# Patient Record
Sex: FEMALE | NOT HISPANIC OR LATINO | Employment: OTHER | RURAL
[De-identification: names, ages, dates, MRNs, and addresses within clinical notes are randomized per-mention and may not be internally consistent; named-entity substitution may affect disease eponyms.]

---

## 2022-10-20 ENCOUNTER — CLINICAL SUPPORT (OUTPATIENT)
Dept: FAMILY MEDICINE CLINIC | Facility: CLINIC | Age: 87
End: 2022-10-20

## 2022-10-20 DIAGNOSIS — Z23 FLU VACCINE NEED: Primary | ICD-10-CM

## 2022-10-20 PROCEDURE — 90662 IIV NO PRSV INCREASED AG IM: CPT | Performed by: NURSE PRACTITIONER

## 2022-10-20 PROCEDURE — G0008 ADMIN INFLUENZA VIRUS VAC: HCPCS | Performed by: NURSE PRACTITIONER

## 2022-12-30 ENCOUNTER — CLINICAL SUPPORT (OUTPATIENT)
Dept: FAMILY MEDICINE CLINIC | Facility: CLINIC | Age: 87
End: 2022-12-30

## 2022-12-30 DIAGNOSIS — Z00.00 ENCOUNTER FOR ANNUAL PHYSICAL EXAM: Primary | ICD-10-CM

## 2022-12-30 PROCEDURE — 36415 COLL VENOUS BLD VENIPUNCTURE: CPT | Performed by: NURSE PRACTITIONER

## 2022-12-30 NOTE — PROGRESS NOTES
Venipuncture Blood Specimen Collection  Venipuncture performed in left arm by Agatha Merrill MA with good hemostasis. Patient tolerated the procedure well without complications.   12/30/22   Agatha Merrill MA

## 2022-12-31 LAB
ALBUMIN SERPL-MCNC: 4.3 G/DL (ref 3.5–4.6)
ALBUMIN/GLOB SERPL: 1.9 {RATIO} (ref 1.2–2.2)
ALP SERPL-CCNC: 103 IU/L (ref 44–121)
ALT SERPL-CCNC: 17 IU/L (ref 0–32)
AST SERPL-CCNC: 17 IU/L (ref 0–40)
BASOPHILS # BLD AUTO: 0 X10E3/UL (ref 0–0.2)
BASOPHILS NFR BLD AUTO: 0 %
BILIRUB SERPL-MCNC: 0.4 MG/DL (ref 0–1.2)
BUN SERPL-MCNC: 28 MG/DL (ref 10–36)
BUN/CREAT SERPL: 26 (ref 12–28)
CALCIUM SERPL-MCNC: 9.3 MG/DL (ref 8.7–10.3)
CHLORIDE SERPL-SCNC: 102 MMOL/L (ref 96–106)
CHOLEST SERPL-MCNC: 128 MG/DL (ref 100–199)
CO2 SERPL-SCNC: 27 MMOL/L (ref 20–29)
CREAT SERPL-MCNC: 1.06 MG/DL (ref 0.57–1)
EGFRCR SERPLBLD CKD-EPI 2021: 50 ML/MIN/1.73
EOSINOPHIL # BLD AUTO: 0.1 X10E3/UL (ref 0–0.4)
EOSINOPHIL NFR BLD AUTO: 2 %
ERYTHROCYTE [DISTWIDTH] IN BLOOD BY AUTOMATED COUNT: 13.1 % (ref 11.7–15.4)
GLOBULIN SER CALC-MCNC: 2.3 G/DL (ref 1.5–4.5)
GLUCOSE SERPL-MCNC: 104 MG/DL (ref 70–99)
HBA1C MFR BLD: 6.6 % (ref 4.8–5.6)
HCT VFR BLD AUTO: 42.6 % (ref 34–46.6)
HCV AB S/CO SERPL IA: 0.2 S/CO RATIO (ref 0–0.9)
HDLC SERPL-MCNC: 50 MG/DL
HGB BLD-MCNC: 13.8 G/DL (ref 11.1–15.9)
IMM GRANULOCYTES # BLD AUTO: 0 X10E3/UL (ref 0–0.1)
IMM GRANULOCYTES NFR BLD AUTO: 0 %
LDLC SERPL CALC-MCNC: 61 MG/DL (ref 0–99)
LYMPHOCYTES # BLD AUTO: 1.2 X10E3/UL (ref 0.7–3.1)
LYMPHOCYTES NFR BLD AUTO: 21 %
MCH RBC QN AUTO: 28.9 PG (ref 26.6–33)
MCHC RBC AUTO-ENTMCNC: 32.4 G/DL (ref 31.5–35.7)
MCV RBC AUTO: 89 FL (ref 79–97)
MONOCYTES # BLD AUTO: 0.5 X10E3/UL (ref 0.1–0.9)
MONOCYTES NFR BLD AUTO: 8 %
NEUTROPHILS # BLD AUTO: 3.9 X10E3/UL (ref 1.4–7)
NEUTROPHILS NFR BLD AUTO: 69 %
PLATELET # BLD AUTO: 157 X10E3/UL (ref 150–450)
POTASSIUM SERPL-SCNC: 4.5 MMOL/L (ref 3.5–5.2)
PROT SERPL-MCNC: 6.6 G/DL (ref 6–8.5)
RBC # BLD AUTO: 4.78 X10E6/UL (ref 3.77–5.28)
SODIUM SERPL-SCNC: 142 MMOL/L (ref 134–144)
TRIGL SERPL-MCNC: 91 MG/DL (ref 0–149)
TSH SERPL DL<=0.005 MIU/L-ACNC: 1.43 UIU/ML (ref 0.45–4.5)
VLDLC SERPL CALC-MCNC: 17 MG/DL (ref 5–40)
WBC # BLD AUTO: 5.7 X10E3/UL (ref 3.4–10.8)

## 2023-01-06 ENCOUNTER — TELEPHONE (OUTPATIENT)
Dept: FAMILY MEDICINE CLINIC | Facility: CLINIC | Age: 88
End: 2023-01-06
Payer: MEDICARE

## 2023-01-06 NOTE — TELEPHONE ENCOUNTER
Caller: Lou Garcia    Relationship: Self    Best call back number: [phone]    What test was performed: LAB RESULTS    When was the test performed: 12.30.22  Where was the test performed: IN OFFICE    Additional notes: CALL WITH RESULTS

## 2023-01-06 NOTE — TELEPHONE ENCOUNTER
Called and spoke with patient and advised her that she needs an appointment.    I made her an appointment

## 2023-01-10 ENCOUNTER — OFFICE VISIT (OUTPATIENT)
Dept: FAMILY MEDICINE CLINIC | Facility: CLINIC | Age: 88
End: 2023-01-10
Payer: MEDICARE

## 2023-01-10 VITALS
SYSTOLIC BLOOD PRESSURE: 130 MMHG | TEMPERATURE: 97.5 F | OXYGEN SATURATION: 96 % | WEIGHT: 147.8 LBS | HEART RATE: 84 BPM | DIASTOLIC BLOOD PRESSURE: 78 MMHG | RESPIRATION RATE: 18 BRPM

## 2023-01-10 DIAGNOSIS — R00.2 INTERMITTENT PALPITATIONS: ICD-10-CM

## 2023-01-10 DIAGNOSIS — I10 PRIMARY HYPERTENSION: Primary | ICD-10-CM

## 2023-01-10 DIAGNOSIS — I48.0 PAROXYSMAL ATRIAL FIBRILLATION: ICD-10-CM

## 2023-01-10 DIAGNOSIS — G47.33 OSA (OBSTRUCTIVE SLEEP APNEA): ICD-10-CM

## 2023-01-10 DIAGNOSIS — E11.9 CONTROLLED TYPE 2 DIABETES MELLITUS WITHOUT COMPLICATION, WITHOUT LONG-TERM CURRENT USE OF INSULIN: ICD-10-CM

## 2023-01-10 PROBLEM — R27.0 ATAXIA: Status: ACTIVE | Noted: 2023-01-10

## 2023-01-10 PROCEDURE — 99214 OFFICE O/P EST MOD 30 MIN: CPT | Performed by: NURSE PRACTITIONER

## 2023-01-10 RX ORDER — APIXABAN 2.5 MG/1
2.5 TABLET, FILM COATED ORAL 2 TIMES DAILY
COMMUNITY
Start: 2023-01-02 | End: 2023-03-08 | Stop reason: SDUPTHER

## 2023-01-10 RX ORDER — PANTOPRAZOLE SODIUM 40 MG/1
40 TABLET, DELAYED RELEASE ORAL EVERY MORNING
COMMUNITY
Start: 2022-10-26

## 2023-01-10 RX ORDER — LOSARTAN POTASSIUM 50 MG/1
50 TABLET ORAL DAILY
COMMUNITY
Start: 2023-01-02 | End: 2023-03-09 | Stop reason: SDUPTHER

## 2023-01-10 RX ORDER — METOPROLOL SUCCINATE 50 MG/1
50 TABLET, EXTENDED RELEASE ORAL DAILY
COMMUNITY
Start: 2022-11-15 | End: 2023-03-09 | Stop reason: SDUPTHER

## 2023-01-10 NOTE — ASSESSMENT & PLAN NOTE
Diabetes mellitus, A1c 6.6%     Patient states very satisfactory fasting sugars at home.  Plan:       1.  Glipizide ER 2.5 mg once daily in the morning only if fasting blood sugar exceeds 100

## 2023-01-10 NOTE — ASSESSMENT & PLAN NOTE
Continues clinically in sinus rhythm with a well-controlled rate on today's exam and on all medical visits elsewhere recently.  Plan:       1.  Diltiazem  mg as above for rate control.       2.  Eliquis 2.5 mg twice a day for anticoagulation.       3.  Aspirin 81 mg 3 times a week for anticoagulation

## 2023-01-10 NOTE — ASSESSMENT & PLAN NOTE
Intermittent palpitations precipitating significant anxiety episodes.  Cardiac monitor revealed frequent small runs of SVT, a single 3 beat run of ventricular tachycardia.  Patient was placed on metoprolol succinate 50 mg daily in addition to her other medications.  Asymptomatic as of visit 6/17/2022 Plan:       1.  Metoprolol succinate 50 mg, 1 daily to be continued

## 2023-01-10 NOTE — PROGRESS NOTES
Office Note     Name: Lou Garcia    : 1932     MRN: [id number]     Chief Complaint  Discuss lab results    Subjective     History of Present Illness:  Lou Garcia is a 89 y.o. female who presents today for follow-up on recent lab work. She is followed predominantly for HTN, afib, AKASH and DM II. Her BP is well-controlled, she checks this daily as well as her glucose. She checks her glucose every evening and some morning. Fasting glucose this morning was 110, last night's reading was 143, this is average for her. Her A1C is at goal, 6.6%. She is sleeping well with continued BIPAP compliance. She has no complaints or concerns today.     Review of Systems   Constitutional: Negative for activity change, appetite change and fatigue.   HENT: Negative for congestion, hearing loss and nosebleeds.    Eyes: Negative for blurred vision, double vision and visual disturbance.   Respiratory: Negative for cough, chest tightness, shortness of breath and wheezing.    Cardiovascular: Negative for chest pain, palpitations and leg swelling.   Gastrointestinal: Negative for abdominal pain, constipation, diarrhea, vomiting and indigestion.   Endocrine: Negative for polydipsia and polyuria.   Genitourinary: Positive for urgency. Negative for difficulty urinating and urinary incontinence.   Musculoskeletal: Positive for gait problem. Negative for arthralgias and myalgias.   Skin: Negative for rash and bruise.   Neurological: Negative for dizziness, weakness, light-headedness, numbness, headache, memory problem and confusion.   Psychiatric/Behavioral: Negative for sleep disturbance and depressed mood. The patient is not nervous/anxious.        Objective     History reviewed. No pertinent past medical history.  History reviewed. No pertinent surgical history.  History reviewed. No pertinent family history.    Vital Signs  /78 (BP Location: Left arm, Patient Position: Sitting, Cuff Size: Adult)   Pulse 84   Temp 97.5 °F  (36.4 °C) (Temporal)   Resp 18   Wt 67 kg (147 lb 12.8 oz)   SpO2 96%   There is no height or weight on file to calculate BMI.    Physical Exam  Vitals reviewed.   Constitutional:       Appearance: Normal appearance.   HENT:      Head: Normocephalic and atraumatic.      Right Ear: Tympanic membrane, ear canal and external ear normal.      Left Ear: Tympanic membrane, ear canal and external ear normal.      Nose: Nose normal.      Mouth/Throat:      Mouth: Mucous membranes are moist.      Pharynx: Oropharynx is clear.   Eyes:      Conjunctiva/sclera: Conjunctivae normal.      Pupils: Pupils are equal, round, and reactive to light.   Cardiovascular:      Rate and Rhythm: Normal rate and regular rhythm.      Pulses: Normal pulses.      Heart sounds: Normal heart sounds.   Pulmonary:      Effort: Pulmonary effort is normal.      Breath sounds: Normal breath sounds.   Abdominal:      General: Bowel sounds are normal.      Palpations: Abdomen is soft.   Musculoskeletal:         General: Normal range of motion.      Cervical back: Normal range of motion and neck supple.   Skin:     General: Skin is warm.      Coloration: Skin is pale.   Neurological:      Mental Status: She is alert and oriented to person, place, and time.   Psychiatric:         Mood and Affect: Mood normal.         Behavior: Behavior normal.         Thought Content: Thought content normal.         Judgment: Judgment normal.          The following data was reviewed by: SHELBY Dove on 01/10/2023:  Common labs    Common Labs 12/30/22 12/30/22 12/30/22 12/30/22    1009 1009 1009 1009   Glucose  104 (A)     BUN  28     Creatinine  1.06 (A)     Sodium  142     Potassium  4.5     Chloride  102     Calcium  9.3     Total Protein  6.6     Albumin  4.3     Total Bilirubin  0.4     Alkaline Phosphatase  103     AST (SGOT)  17     ALT (SGPT)  17     WBC 5.7      Hemoglobin 13.8      Hematocrit 42.6      Platelets 157      Total Cholesterol   128     Triglycerides   91    HDL Cholesterol   50    LDL Cholesterol    61    Hemoglobin A1C    6.6 (A)   (A) Abnormal value       Comments are available for some flowsheets but are not being displayed.                POCT Results (if applicable):  Results for orders placed or performed in visit on 12/30/22   Comprehensive Metabolic Panel    Specimen: Arm, Left; Blood    Blood  Manual Differen   Result Value Ref Range    Glucose 104 (H) 70 - 99 mg/dL    BUN 28 10 - 36 mg/dL    Creatinine 1.06 (H) 0.57 - 1.00 mg/dL    EGFR Result 50 (L) >59 mL/min/1.73    BUN/Creatinine Ratio 26 12 - 28    Sodium 142 134 - 144 mmol/L    Potassium 4.5 3.5 - 5.2 mmol/L    Chloride 102 96 - 106 mmol/L    Total CO2 27 20 - 29 mmol/L    Calcium 9.3 8.7 - 10.3 mg/dL    Total Protein 6.6 6.0 - 8.5 g/dL    Albumin 4.3 3.5 - 4.6 g/dL    Globulin 2.3 1.5 - 4.5 g/dL    A/G Ratio 1.9 1.2 - 2.2    Total Bilirubin 0.4 0.0 - 1.2 mg/dL    Alkaline Phosphatase 103 44 - 121 IU/L    AST (SGOT) 17 0 - 40 IU/L    ALT (SGPT) 17 0 - 32 IU/L   Lipid Panel    Specimen: Arm, Left; Blood    Blood  Manual Differen   Result Value Ref Range    Total Cholesterol 128 100 - 199 mg/dL    Triglycerides 91 0 - 149 mg/dL    HDL Cholesterol 50 >39 mg/dL    VLDL Cholesterol Isaias 17 5 - 40 mg/dL    LDL Chol Calc (NIH) 61 0 - 99 mg/dL   Hemoglobin A1c    Specimen: Arm, Left; Blood    Blood  Manual Differen   Result Value Ref Range    Hemoglobin A1C 6.6 (H) 4.8 - 5.6 %   TSH Rfx On Abnormal To Free T4    Specimen: Arm, Left; Blood    Blood  Manual Differen   Result Value Ref Range    TSH 1.430 0.450 - 4.500 uIU/mL   Hepatitis C Antibody    Specimen: Arm, Left; Blood    Blood  Manual Differen   Result Value Ref Range    Hep C Virus Ab 0.2 0.0 - 0.9 s/co ratio   CBC w AUTO Differential    Specimen: Arm, Left; Blood    Blood  Manual Differen   Result Value Ref Range    WBC 5.7 3.4 - 10.8 x10E3/uL    RBC 4.78 3.77 - 5.28 x10E6/uL    Hemoglobin 13.8 11.1 - 15.9 g/dL    Hematocrit 42.6  34.0 - 46.6 %    MCV 89 79 - 97 fL    MCH 28.9 26.6 - 33.0 pg    MCHC 32.4 31.5 - 35.7 g/dL    RDW 13.1 11.7 - 15.4 %    Platelets 157 150 - 450 x10E3/uL    Neutrophil Rel % 69 Not Estab. %    Lymphocyte Rel % 21 Not Estab. %    Monocyte Rel % 8 Not Estab. %    Eosinophil Rel % 2 Not Estab. %    Basophil Rel % 0 Not Estab. %    Neutrophils Absolute 3.9 1.4 - 7.0 x10E3/uL    Lymphocytes Absolute 1.2 0.7 - 3.1 x10E3/uL    Monocytes Absolute 0.5 0.1 - 0.9 x10E3/uL    Eosinophils Absolute 0.1 0.0 - 0.4 x10E3/uL    Basophils Absolute 0.0 0.0 - 0.2 x10E3/uL    Immature Granulocyte Rel % 0 Not Estab. %    Immature Grans Absolute 0.0 0.0 - 0.1 x10E3/uL            Assessment and Plan     Diagnoses and all orders for this visit:    1. Primary hypertension (Primary)  Assessment & Plan:  BP well controlled on current regimen.   Low sodium diet        2. Paroxysmal atrial fibrillation (HCC)  Assessment & Plan:  Continues clinically in sinus rhythm with a well-controlled rate on today's exam and on all medical visits elsewhere recently.  Plan:       1.  Diltiazem  mg as above for rate control.       2.  Eliquis 2.5 mg twice a day for anticoagulation.       3.  Aspirin 81 mg 3 times a week for anticoagulation      3. Controlled type 2 diabetes mellitus without complication, without long-term current use of insulin ([facility])  Assessment & Plan:  Diabetes mellitus, A1c 6.6%     Patient states very satisfactory fasting sugars at home.  Plan:       1.  Glipizide ER 2.5 mg once daily in the morning only if fasting blood sugar exceeds 100      4. AKASH (obstructive sleep apnea)  Assessment & Plan:   followed by Dr. Meehan   .  Plan:       1.  BiPAP to be continued.      5. Intermittent palpitations  Assessment & Plan:   Intermittent palpitations precipitating significant anxiety episodes.  Cardiac monitor revealed frequent small runs of SVT, a single 3 beat run of ventricular tachycardia.  Patient was placed on metoprolol succinate 50 mg  daily in addition to her other medications.  Asymptomatic as of visit 6/17/2022 Plan:       1.  Metoprolol succinate 50 mg, 1 daily to be continued      BMI cannot be calculated due to outdated height or weight values.  Please input a current height/weight in Vitals and re-renter BMIFOLLOWUP in Note to pull in correct documentation based on BMI range.    I spent 35 minutes caring for Lou on this date of service. This time includes time spent by me in the following activities:preparing for the visit, reviewing tests, documenting information in the medical record and independently interpreting results and communicating that information with the patient/family/caregiver    Vaccine Counseling:  “Discussed risks/benefits to vaccination, reviewed components of the vaccine, discussed VIS, discussed informed consent, informed consent obtained. Patient/Parent was allowed to accept or refuse vaccine. Questions answered to satisfactory state of patient/Parent. We reviewed typical age appropriate and seasonally appropriate vaccinations. Reviewed immunization history and updated state vaccination form as needed. Patient was counseled on DTap/DT  PPSV23  Zoster      Advanced Care Planning:   Patient has an advance directive (not in EMR), copy requested.        Follow Up  Return in about 6 months (around 7/10/2023) for Next scheduled follow up.    Sandra Mckenzie, SHELBY

## 2023-02-22 ENCOUNTER — TELEPHONE (OUTPATIENT)
Dept: CARDIOLOGY | Facility: CLINIC | Age: 88
End: 2023-02-22
Payer: MEDICARE

## 2023-02-22 ENCOUNTER — TELEPHONE (OUTPATIENT)
Dept: FAMILY MEDICINE CLINIC | Facility: CLINIC | Age: 88
End: 2023-02-22
Payer: MEDICARE

## 2023-02-22 NOTE — TELEPHONE ENCOUNTER
PATIENT CALLED AND LEFT A VOICEMAIL STATING SHE WAS SEEN FOR AFIB IN January AND WAS TOLD IF SHE NEEDED TO BE SEEN SOONER THAN TO HER FOLLOW UP, TO CALL. PATIENT STATED IN VOICEMAIL THAT SHE HAD AN EPISODE RECENTLY AND ENDED UP IN THE ER. SHE STATES SHE WOULD LIKE TO BE SEEN BUT SHE ISN'T SCHEDULED UNTIL 03/30/23. PATIENT CAN BE REACHED [phone].

## 2023-02-22 NOTE — TELEPHONE ENCOUNTER
Called pt with her concerns of being able to be seen sooner.  She has been rescheduled for 3/8/2023 @ 11:00 AM.  She is advised to promptly seek medical attention if she is having symptoms.  She relates she will be seeing Dr. Mcnulty on Thursday 2/23/2023.  She verbalizes understanding.

## 2023-02-22 NOTE — TELEPHONE ENCOUNTER
Caller: Lou Garcia    Relationship: Self    Best call back number: [phone]    What medication are you requesting:   MUSCLE RELAXER     What are your current symptoms:   NERVE PAIN AND MUSCLE PAIN     How long have you been experiencing symptoms:   A WHILE NOW     Have you had these symptoms before:    [x] Yes  [] No    Have you been treated for these symptoms before:   [x] Yes  [] No    If a prescription is needed, what is your preferred pharmacy and phone number:      [id number] [place] [address] [address] AT [facility] [address] - [phone]  - [phone] FX  [phone]    Additional notes:  PATIENT WOULD LIKE FOR MEDICATION TO BE CALLED INTO THE PHARMACY TO HELP WITH NERVE AND MUSCLE PAIN

## 2023-03-07 ENCOUNTER — TELEPHONE (OUTPATIENT)
Dept: CARDIOLOGY | Facility: CLINIC | Age: 88
End: 2023-03-07
Payer: MEDICARE

## 2023-03-07 NOTE — TELEPHONE ENCOUNTER
Called pt back as she needs refill on her Metoprolol.  She relates she is headed to [facility] to obtain enough Metoprolol for today and tomorrow as she knows she has an appointment on 3/8/2023 @ 11:00 AM.  Wishes to address her meds at that time.

## 2023-03-07 NOTE — TELEPHONE ENCOUNTER
Incoming Refill Request      Medication requested (name and dose): METOPROLOL - UNSURE OF DOSING    Pharmacy where request should be sent: [facility] [place]    Additional details provided by patient: COMPLETELY OUT OF REFILLS - ONLY WANTS 30 DAYS AT THIS TIME    Best call back number: [phone]    Does the patient have less than a 3 day supply:  [x] Yes  [] No    Brenna Gregory Rep  03/07/23, 16:06 EST

## 2023-03-08 ENCOUNTER — OFFICE VISIT (OUTPATIENT)
Dept: CARDIOLOGY | Facility: CLINIC | Age: 88
End: 2023-03-08
Payer: MEDICARE

## 2023-03-08 VITALS
SYSTOLIC BLOOD PRESSURE: 128 MMHG | BODY MASS INDEX: 26.5 KG/M2 | OXYGEN SATURATION: 95 % | WEIGHT: 144 LBS | HEART RATE: 80 BPM | HEIGHT: 62 IN | DIASTOLIC BLOOD PRESSURE: 68 MMHG

## 2023-03-08 DIAGNOSIS — G47.33 OSA (OBSTRUCTIVE SLEEP APNEA): ICD-10-CM

## 2023-03-08 DIAGNOSIS — I48.0 PAROXYSMAL ATRIAL FIBRILLATION: ICD-10-CM

## 2023-03-08 DIAGNOSIS — Z09 HOSPITAL DISCHARGE FOLLOW-UP: ICD-10-CM

## 2023-03-08 DIAGNOSIS — E61.2 MAGNESIUM DEFICIENCY: ICD-10-CM

## 2023-03-08 PROCEDURE — 99214 OFFICE O/P EST MOD 30 MIN: CPT | Performed by: NURSE PRACTITIONER

## 2023-03-08 RX ORDER — APIXABAN 2.5 MG/1
2.5 TABLET, FILM COATED ORAL 2 TIMES DAILY
Qty: 60 TABLET | Refills: 6 | Status: SHIPPED | OUTPATIENT
Start: 2023-03-08 | End: 2023-03-09 | Stop reason: SDUPTHER

## 2023-03-08 RX ORDER — ALPRAZOLAM 0.25 MG/1
0.25 TABLET ORAL 2 TIMES DAILY PRN
COMMUNITY

## 2023-03-08 RX ORDER — ASPIRIN 81 MG/1
81 TABLET ORAL DAILY
COMMUNITY

## 2023-03-08 NOTE — TELEPHONE ENCOUNTER
Incoming Refill Request      Medication requested (name and dose): ELIQUIS 2.5MG    Pharmacy where request should be sent: [facility] IN [place]    Additional details provided by patient: PATIENT REQUESTED 60 DAY SUPPLY    Best call back number: [phone]    Does the patient have less than a 3 day supply:  [] Yes  [x] No    Brenna Gregory Rep  03/08/23, 09:11 EST

## 2023-03-09 DIAGNOSIS — E61.2 MAGNESIUM DEFICIENCY: ICD-10-CM

## 2023-03-09 PROBLEM — R00.2 INTERMITTENT PALPITATIONS: Status: RESOLVED | Noted: 2023-01-10 | Resolved: 2023-03-09

## 2023-03-09 PROCEDURE — 93000 ELECTROCARDIOGRAM COMPLETE: CPT | Performed by: NURSE PRACTITIONER

## 2023-03-09 RX ORDER — LOSARTAN POTASSIUM 50 MG/1
50 TABLET ORAL DAILY
Qty: 90 TABLET | Refills: 1 | Status: SHIPPED | OUTPATIENT
Start: 2023-03-09

## 2023-03-09 RX ORDER — METOPROLOL SUCCINATE 50 MG/1
50 TABLET, EXTENDED RELEASE ORAL DAILY
Qty: 90 TABLET | Refills: 1 | Status: SHIPPED | OUTPATIENT
Start: 2023-03-09

## 2023-03-10 NOTE — ASSESSMENT & PLAN NOTE
- Should be getting new BiPAP soon.  As hers is over 5 years old.    - Patient will need a 31 to 90-day follow-up

## 2023-03-10 NOTE — PROGRESS NOTES
[facility] Provider Note     Date:   2023   Name: Lou Garcia  :   1932  PCP: Sandra Mckenzie APRN    Chief Complaint   Patient presents with   • Atrial Fibrillation     Follow up from ER 2023       Subjective     History of Present Illness  Lou Garcia is a 89 y.o. female who presents today for follow-up from ER.  She has atrial fibrillation as well as a new diagnosis of AKASH.  She has not gotten her BiPAP yet so she will need to come back for 31 to 90-day after it is set up.    2023 [facility] ER discharge note reviewed.  Patient presented with a sensation of palpitations in her chest.  She was concerned that her A-fib was acting up.  Her EKG G was normal sinus rhythm with no ST changes.  Her magnesium was borderline low at 1.8 and patient was complaining of muscle cramps.  She was advised to replace magnesium with Rolaids 3 in the morning and 3 at night.  She reports that she has not had any further episodes since starting the magnesium.  Her daughter is with her today and she agrees that she seems to be doing much better since starting her Rolaids.  This also makes me concerned perhaps she also has some underlying acid reflux issues.  As her chest pain was in the center almost epigastric area.  Patient, daughter, and myself discussed the possibility of increasing beta-blocker but decided not to at this time since patient only had this 1 episode and it was not in the left chest or sense of palpitations was mid sternum epigastric area and it has resolved since starting Rolaids.  I would like to update labs just to make sure that she is not getting too much magnesium now.    History of AKASH with a baseline AHI of 106 on 2016.  We sent an order for new PAP machine since hers was over 5 years old in 2023.  Staff called [employer] today to get an update and they said they just got new BiPAP skin and will call patient  to get set up.  Patient will need a 31 to 90-day follow-up on AKASH.      EKG January 26, 2023 sinus rhythm with occasional supraventricular premature complexes.  March 8, 2023 EKG sinus rhythm.    December 14, 2021 echo with normal EF, mild concentric left ventricular hypertrophy, mild to moderate MR, abnormal diastolic function, mild mitral stenosis, mild tricuspid regurgitation, mild pulmonary hypertension.    Last Holter January 2022    Today she denies any chest pain, shortness of air, edema, palpitations, dizziness, or syncope.    ..[id number] Score: 5      No Known Allergies    Current Outpatient Medications:   •  ALPRAZolam (XANAX) 0.25 MG tablet, Take 1 tablet by mouth 2 (Two) Times a Day As Needed for Anxiety., Disp: , Rfl:   •  apixaban (Eliquis) 2.5 MG tablet tablet, Take 1 tablet by mouth 2 (Two) Times a Day., Disp: 180 tablet, Rfl: 1  •  aspirin 81 MG EC tablet, Take 1 tablet by mouth Daily., Disp: , Rfl:   •  losartan (COZAAR) 50 MG tablet, Take 1 tablet by mouth Daily., Disp: 90 tablet, Rfl: 1  •  metoprolol succinate XL (TOPROL-XL) 50 MG 24 hr tablet, Take 1 tablet by mouth Daily., Disp: 90 tablet, Rfl: 1  •  pantoprazole (PROTONIX) 40 MG EC tablet, Take 1 tablet by mouth Every Morning., Disp: , Rfl:     Past Medical History:   Diagnosis Date   • Cataracts, bilateral    • Essential (primary) hypertension    • Nonrheumatic mitral (valve) insufficiency    • Obstructive sleep apnea (adult) (pediatric)    • AKASH (obstructive sleep apnea)     : BIPAP   • Other long term (current) drug therapy    • PAF (paroxysmal atrial fibrillation) (HCC)    • Palpitations    • Type 2 diabetes mellitus without complication (HCC)    • Unspecified diastolic (congestive) heart failure (HCC)    • Ventricular tachycardia       Past Surgical History:   Procedure Laterality Date   • CATARACT EXTRACTION     • CHOLECYSTECTOMY       Family History   Problem Relation Age of Onset   • Coronary artery disease Mother    • Sleep  "apnea Mother    • Cancer Father    • Cancer Sister    • Sleep apnea Sister    • Coronary artery disease Sister    • Heart disease Brother    • Diabetes Brother    • Heart disease Brother      Social History     Socioeconomic History   • Marital status:    • Number of children: 1   Tobacco Use   • Smoking status: Former     Types: Cigarettes   Substance and Sexual Activity   • Alcohol use: Not Currently   • Drug use: Not Currently       Objective     Vital Signs:  /68 (BP Location: Left arm)   Pulse 80   Ht 157.5 cm (62\")   Wt 65.3 kg (144 lb)   SpO2 95%   BMI 26.34 kg/m²   Estimated body mass index is 26.34 kg/m² as calculated from the following:    Height as of this encounter: 157.5 cm (62\").    Weight as of this encounter: 65.3 kg (144 lb).             Physical Exam  Vitals reviewed.   Cardiovascular:      Rate and Rhythm: Normal rate and regular rhythm.      Pulses: Normal pulses.      Heart sounds: Normal heart sounds.   Pulmonary:      Effort: Pulmonary effort is normal.   Skin:     General: Skin is warm and dry.   Neurological:      Mental Status: She is alert and oriented to person, place, and time.   Psychiatric:         Mood and Affect: Mood normal.                 ECG 12 Lead    Date/Time: 3/9/2023 10:41 PM  Performed by: Linda Dominguez APRN  Authorized by: Linda Dominguez APRN   Comparison: compared with previous ECG from 1/26/2023  Similar to previous ECG  Comparison to previous ECG: Sinus rhythm with occasional supraventricular premature complexes  Rhythm: sinus rhythm    Clinical impression: normal ECG             Assessment and Plan     Diagnoses and all orders for this visit:    1. Hospital discharge follow-up  Comments:  - Follow-up on ER visit to [facility] for palpitations.  EKG was normal sinus rhythm.  Patient was found to be slightly low on magnesium.    Orders:  -     ECG 12 Lead    2. Magnesium deficiency  Assessment & Plan:  - Replacing " with Rolaids 3 in the morning and 3 at night.  Possible coexisting acid reflux.    Orders:  -     Basic Metabolic Panel; Future  -     SCANNED - LABS  -     ECG 12 Lead    3. Paroxysmal atrial fibrillation (HCC)  Assessment & Plan:  - Patient is on Eliquis and aspirin.  Rate controlled.    Orders:  -     losartan (COZAAR) 50 MG tablet; Take 1 tablet by mouth Daily.  Dispense: 90 tablet; Refill: 1  -     metoprolol succinate XL (TOPROL-XL) 50 MG 24 hr tablet; Take 1 tablet by mouth Daily.  Dispense: 90 tablet; Refill: 1  -     apixaban (Eliquis) 2.5 MG tablet tablet; Take 1 tablet by mouth 2 (Two) Times a Day.  Dispense: 180 tablet; Refill: 1  -     ECG 12 Lead    4. AKASH (obstructive sleep apnea)  Assessment & Plan:  - Should be getting new BiPAP soon.  As hers is over 5 years old.    - Patient will need a 31 to 90-day follow-up        Recommendations: ER if symptoms increase, Sleep hygiene discussed, Sleep risks reviewed (driving, medical, sleep death, sedating agents), Limit salt, Stop cigarettes, Limit caffeine, Report if any new/changing symptoms immediately and Increase pap therapy usage          Follow Up  Return for 2-3 months AKASH/Afib vin or emmanuelle.  Patient was given instructions and counseling regarding her condition or for health maintenance advice. Please see specific information pulled into the AVS if appropriate.

## 2023-10-09 ENCOUNTER — TELEPHONE (OUTPATIENT)
Dept: CARDIOLOGY | Facility: CLINIC | Age: 88
End: 2023-10-09
Payer: MEDICARE

## 2023-10-09 NOTE — TELEPHONE ENCOUNTER
Patient called in for refill on losartan 50 mg ans Eliquis 2.5 mg. Patient was seen 3/8/2023 Losartan was on med list but visit 6/30/2023 was not on med list I do not see wherer it was D/C please advise. Patent uses [facility].

## 2023-10-11 DIAGNOSIS — I48.0 PAROXYSMAL ATRIAL FIBRILLATION: ICD-10-CM

## 2023-10-11 RX ORDER — LOSARTAN POTASSIUM 50 MG/1
50 TABLET ORAL DAILY
Qty: 30 TABLET | Refills: 1 | Status: SHIPPED | OUTPATIENT
Start: 2023-10-11 | End: 2023-10-11

## 2023-10-11 RX ORDER — LOSARTAN POTASSIUM 50 MG/1
50 TABLET ORAL DAILY
Qty: 90 TABLET | Refills: 1 | Status: SHIPPED | OUTPATIENT
Start: 2023-10-11

## 2023-10-11 RX ORDER — LOSARTAN POTASSIUM 50 MG/1
1 TABLET ORAL DAILY
COMMUNITY
Start: 2023-07-05 | End: 2023-10-11 | Stop reason: SDUPTHER

## 2023-12-29 ENCOUNTER — OFFICE VISIT (OUTPATIENT)
Dept: CARDIOLOGY | Facility: CLINIC | Age: 88
End: 2023-12-29
Payer: MEDICARE

## 2023-12-29 VITALS
DIASTOLIC BLOOD PRESSURE: 70 MMHG | WEIGHT: 140 LBS | OXYGEN SATURATION: 96 % | BODY MASS INDEX: 25.76 KG/M2 | HEART RATE: 71 BPM | HEIGHT: 62 IN | SYSTOLIC BLOOD PRESSURE: 128 MMHG

## 2023-12-29 DIAGNOSIS — I48.0 PAROXYSMAL ATRIAL FIBRILLATION: ICD-10-CM

## 2023-12-29 DIAGNOSIS — G47.33 OBSTRUCTIVE SLEEP APNEA SYNDROME: ICD-10-CM

## 2023-12-29 PROBLEM — G47.30 SLEEP APNEA: Status: ACTIVE | Noted: 2023-01-10

## 2023-12-29 PROBLEM — N28.1 RENAL CYST: Status: ACTIVE | Noted: 2023-01-10

## 2023-12-29 PROBLEM — M20.41 ACQUIRED HAMMERTOES OF BOTH FEET: Status: ACTIVE | Noted: 2023-12-29

## 2023-12-29 PROBLEM — N39.0 URINARY TRACT INFECTIOUS DISEASE: Status: ACTIVE | Noted: 2023-01-10

## 2023-12-29 PROBLEM — L60.8: Status: ACTIVE | Noted: 2023-12-29

## 2023-12-29 PROBLEM — M20.12 ACQUIRED HALLUX VALGUS OF BOTH FEET: Status: ACTIVE | Noted: 2023-12-29

## 2023-12-29 PROBLEM — B35.1 ONYCHOMYCOSIS: Status: ACTIVE | Noted: 2023-12-29

## 2023-12-29 PROBLEM — M81.0 OSTEOPOROSIS: Status: ACTIVE | Noted: 2023-01-10

## 2023-12-29 PROBLEM — E11.8 DIABETIC FOOT: Status: ACTIVE | Noted: 2023-12-29

## 2023-12-29 PROBLEM — E11.9 DIABETES MELLITUS: Status: ACTIVE | Noted: 2023-01-10

## 2023-12-29 PROBLEM — N89.5 STENOSIS OF VAGINA: Status: ACTIVE | Noted: 2023-01-10

## 2023-12-29 PROBLEM — N39.3 FEMALE STRESS INCONTINENCE: Status: ACTIVE | Noted: 2023-01-10

## 2023-12-29 PROBLEM — R20.2 TINGLING OF BOTH FEET: Status: ACTIVE | Noted: 2023-12-29

## 2023-12-29 PROBLEM — M19.90 ARTHRITIS: Status: ACTIVE | Noted: 2023-01-10

## 2023-12-29 PROBLEM — I48.91 ATRIAL FIBRILLATION: Status: ACTIVE | Noted: 2023-01-10

## 2023-12-29 PROBLEM — M20.11 ACQUIRED HALLUX VALGUS OF BOTH FEET: Status: ACTIVE | Noted: 2023-12-29

## 2023-12-29 PROBLEM — K57.92 DIVERTICULITIS: Status: ACTIVE | Noted: 2023-01-10

## 2023-12-29 PROBLEM — M20.42 ACQUIRED HAMMERTOES OF BOTH FEET: Status: ACTIVE | Noted: 2023-12-29

## 2023-12-29 RX ORDER — LOSARTAN POTASSIUM 50 MG/1
50 TABLET ORAL DAILY
Qty: 90 TABLET | Refills: 1 | Status: SHIPPED | OUTPATIENT
Start: 2023-12-29

## 2023-12-29 RX ORDER — METOPROLOL SUCCINATE 50 MG/1
50 TABLET, EXTENDED RELEASE ORAL DAILY
Qty: 90 TABLET | Refills: 1 | Status: SHIPPED | OUTPATIENT
Start: 2023-12-29

## 2023-12-29 NOTE — PROGRESS NOTES
[facility] Provider Note     Date:   2023   Name: Lou Garcia  :   1932  PCP: Sandra Mckenzie APRN    Chief Complaint   Patient presents with   • Sleep Apnea   • Follow-up       Subjective     History of Present Illness  Lou Garcia is a 89 y.o. female who presents today for follow-up on AKASH and A-fib.  We updated her echo yesterday with normal EF.  She is accompanied with her daughter today.  Patient brought her blood pressure and heart rate log.  She said she has noticed the last couple days that she has had a little bit more noticeable fluttering particularly in the mornings.  She is able to sit down and it resolves.  Looking over her heart rate log her heart rate is very well-controlled.    She is also complaining of running out of water in her PAP machine the last couple nights.  We discussed this may be related to him turning up the heat in their home.  They plan to get a vaporizer or humidifier.    PAP compliance report dated 2023 to 2023 download interpreted in clinic today.  Shown patient uses her machine 100% of the time, over 4 hours 93% of the time, for an average use of 7 hours and 9 minutes.  Overall AHI is 5.6 showing decent compliance and decent control.  Patient is benefiting from PAP therapy.  We will plan to continue.  Patient has had a cold and had difficulty using her machine the last week or 2.    Patient and her daughter would like to hold off on increasing metoprolol at this time.  I agree as patient is 89 years old and that high risk for falls with bradycardia.  For now we will plan on seeing her back in 6 months but if her heart rate starts to stay elevated over 80 or above consistently and/or patient's symptoms become troublesome they are to reach back out for possible increase in metoprolol.      Cardiology and sleep related problem list    AKASH- on 2006; [facility]ell  Diabetes  Anxiety  Hypertension  A-fib  Former  smoker    12/27/2023 echo-EF 66%, left ventricular wall thickness is consistent with mild concentric hypertrophy, grade 1 diastolic dysfunction, mild mitral valve stenosis is present with functional MAC, mild mitral valve regurgitation.    12/14/2021 echo- normal EF, mild concentric left ventricular hypertrophy, mild to moderate MR, abnormal diastolic function, mild mitral stenosis, mild tricuspid regurgitation, mild pulmonary hypertension.    1/2022 Holter    No Known Allergies    Current Outpatient Medications:   •  apixaban (Eliquis) 2.5 MG tablet tablet, Take 1 tablet by mouth 2 (Two) Times a Day., Disp: 180 tablet, Rfl: 1  •  aspirin 81 MG EC tablet, Take 1 tablet by mouth Daily., Disp: , Rfl:   •  losartan (COZAAR) 50 MG tablet, Take 1 tablet by mouth Daily., Disp: 90 tablet, Rfl: 1  •  metoprolol succinate XL (TOPROL-XL) 50 MG 24 hr tablet, Take 1 tablet by mouth Daily., Disp: 90 tablet, Rfl: 1  •  pantoprazole (PROTONIX) 40 MG EC tablet, Take 1 tablet by mouth Every Morning., Disp: , Rfl:     Past Medical History:   Diagnosis Date   • Cataracts, bilateral    • Nonrheumatic mitral (valve) insufficiency    • AKASH (obstructive sleep apnea)     : BIPAP   • Other long term (current) drug therapy    • Type 2 diabetes mellitus without complication       Past Surgical History:   Procedure Laterality Date   • CATARACT EXTRACTION     • CHOLECYSTECTOMY       Family History   Problem Relation Age of Onset   • Coronary artery disease Mother    • Sleep apnea Mother    • Cancer Father    • Cancer Sister    • Sleep apnea Sister    • Coronary artery disease Sister    • Heart disease Brother    • Diabetes Brother    • Heart disease Brother      Social History     Socioeconomic History   • Marital status:    • Number of children: 1   Tobacco Use   • Smoking status: Former     Types: Cigarettes     Passive exposure: Past   • Smokeless tobacco: Never   Vaping Use   • Vaping Use: Never used   Substance and Sexual  "Activity   • Alcohol use: Not Currently   • Drug use: Not Currently   • Sexual activity: Defer       Objective     Vital Signs:  /70 (BP Location: Left arm)   Pulse 71   Ht 157.5 cm (62\")   Wt 63.5 kg (140 lb)   SpO2 96%   BMI 25.61 kg/m²   Estimated body mass index is 25.61 kg/m² as calculated from the following:    Height as of this encounter: 157.5 cm (62\").    Weight as of this encounter: 63.5 kg (140 lb).         Physical Exam  Vitals reviewed.   Constitutional:       Appearance: Normal appearance. She is well-developed.   HENT:      Head: Normocephalic and atraumatic.   Eyes:      General: No scleral icterus.     Pupils: Pupils are equal, round, and reactive to light.   Neck:      Vascular: No carotid bruit.   Cardiovascular:      Rate and Rhythm: Normal rate and regular rhythm.      Pulses: Normal pulses.           Radial pulses are 2+ on the right side and 2+ on the left side.        Dorsalis pedis pulses are 2+ on the right side and 2+ on the left side.        Posterior tibial pulses are 2+ on the right side and 2+ on the left side.      Heart sounds: Normal heart sounds. No murmur heard.  Pulmonary:      Breath sounds: Normal breath sounds. No wheezing or rhonchi.   Musculoskeletal:      Right lower leg: No edema.      Left lower leg: No edema.   Skin:     General: Skin is warm and dry.      Capillary Refill: Capillary refill takes less than 2 seconds.      Coloration: Skin is not cyanotic.      Nails: There is no clubbing.   Neurological:      Mental Status: She is alert and oriented to person, place, and time.      Motor: No weakness.      Gait: Gait normal.   Psychiatric:         Mood and Affect: Mood normal.         Behavior: Behavior is cooperative.         Thought Content: Thought content normal.         Cognition and Memory: Memory normal.                     Assessment and Plan     Diagnoses and all orders for this visit:    1. Paroxysmal atrial fibrillation  Comments:  On medical " therapy.  Rate controlled.  Orders:  -     apixaban (Eliquis) 2.5 MG tablet tablet; Take 1 tablet by mouth 2 (Two) Times a Day.  Dispense: 180 tablet; Refill: 1  -     metoprolol succinate XL (TOPROL-XL) 50 MG 24 hr tablet; Take 1 tablet by mouth Daily.  Dispense: 90 tablet; Refill: 1    2. Obstructive sleep apnea syndrome  Comments:  Benefiting from PAP therapy.  Plan to continue.  Orders:  -     PAP Therapy    3. Paroxysmal atrial fibrillation  -     apixaban (Eliquis) 2.5 MG tablet tablet; Take 1 tablet by mouth 2 (Two) Times a Day.  Dispense: 180 tablet; Refill: 1  -     metoprolol succinate XL (TOPROL-XL) 50 MG 24 hr tablet; Take 1 tablet by mouth Daily.  Dispense: 90 tablet; Refill: 1    Other orders  -     losartan (COZAAR) 50 MG tablet; Take 1 tablet by mouth Daily.  Dispense: 90 tablet; Refill: 1        Recommendations: ER if symptoms increase, Report if any new/changing symptoms immediately, Sleep risks reviewed (driving, medical, sleep death, sedating agents), and Sleep hygiene discussed      Follow Up  Return in about 6 months (around 6/29/2024) for Afib.    Linda Dominguez, APRN   12/29/2023     Please note that this explicitly excludes time spent on other separate billable services such as performing procedures or test interpretation, when applicable.    This note was created using dictation software which occasionally transcribes nonsensical phrases. Please contact the provider if any clarification is needed.

## 2024-02-15 ENCOUNTER — TELEPHONE (OUTPATIENT)
Dept: FAMILY MEDICINE CLINIC | Facility: CLINIC | Age: 89
End: 2024-02-15
Payer: MEDICARE

## 2024-02-15 DIAGNOSIS — I10 PRIMARY HYPERTENSION: ICD-10-CM

## 2024-02-15 DIAGNOSIS — Z79.4 TYPE 2 DIABETES MELLITUS WITHOUT COMPLICATION, WITH LONG-TERM CURRENT USE OF INSULIN: Primary | ICD-10-CM

## 2024-02-15 DIAGNOSIS — E11.9 TYPE 2 DIABETES MELLITUS WITHOUT COMPLICATION, WITH LONG-TERM CURRENT USE OF INSULIN: Primary | ICD-10-CM

## 2024-02-15 DIAGNOSIS — E61.2 MAGNESIUM DEFICIENCY: ICD-10-CM

## 2024-02-15 DIAGNOSIS — Z00.00 MEDICARE ANNUAL WELLNESS VISIT, SUBSEQUENT: ICD-10-CM

## 2024-02-21 DIAGNOSIS — I10 PRIMARY HYPERTENSION: Primary | ICD-10-CM

## 2024-02-21 DIAGNOSIS — E11.9 TYPE 2 DIABETES MELLITUS WITHOUT COMPLICATION, UNSPECIFIED WHETHER LONG TERM INSULIN USE: ICD-10-CM

## 2024-02-26 ENCOUNTER — OFFICE VISIT (OUTPATIENT)
Dept: FAMILY MEDICINE CLINIC | Facility: CLINIC | Age: 89
End: 2024-02-26
Payer: MEDICARE

## 2024-02-26 VITALS
SYSTOLIC BLOOD PRESSURE: 116 MMHG | BODY MASS INDEX: 25.76 KG/M2 | OXYGEN SATURATION: 95 % | DIASTOLIC BLOOD PRESSURE: 74 MMHG | HEIGHT: 62 IN | HEART RATE: 82 BPM | WEIGHT: 140 LBS | RESPIRATION RATE: 18 BRPM | TEMPERATURE: 97.7 F

## 2024-02-26 DIAGNOSIS — E66.3 OVERWEIGHT (BMI 25.0-29.9): ICD-10-CM

## 2024-02-26 DIAGNOSIS — I10 PRIMARY HYPERTENSION: ICD-10-CM

## 2024-02-26 DIAGNOSIS — I48.0 PAROXYSMAL ATRIAL FIBRILLATION: Primary | ICD-10-CM

## 2024-02-26 DIAGNOSIS — E11.65 TYPE 2 DIABETES MELLITUS WITH HYPERGLYCEMIA, WITHOUT LONG-TERM CURRENT USE OF INSULIN: ICD-10-CM

## 2024-02-26 DIAGNOSIS — N18.32 STAGE 3B CHRONIC KIDNEY DISEASE: ICD-10-CM

## 2024-02-26 DIAGNOSIS — Z00.00 INITIAL MEDICARE ANNUAL WELLNESS VISIT: ICD-10-CM

## 2024-02-26 NOTE — ASSESSMENT & PLAN NOTE
Patient is very diligent in regards to diet modifications to control her diabetes, A1c in office today is 6.1%.  Patient checks a.m. fasting glucose as well as nightly glucose, always under 180.  She is up-to-date on eye exam without signs of retinopathy.  Foot check conducted in office today within normal limits.  Patient currently not utilizing any medications, controlling solely with dietary modification.

## 2024-02-26 NOTE — ASSESSMENT & PLAN NOTE
Patient continues to be followed by cardiology, Dr. Parker's office.  Rate currently very well-controlled with metoprolol XL 50 mg once daily.  She continues to utilize Eliquis 2.5 mg twice daily.  She has no complaints with high heart rate or palpitations.

## 2024-02-26 NOTE — ASSESSMENT & PLAN NOTE
Patient with longstanding stage IIIb chronic kidney disease, most recent creatinine 1.4, GFR 37.  Microalbumin 99.1mcg/mL  Blood pressure and diabetes management are crucial for retaining current renal function.  Also discussed avoiding nephrotoxic agents such as ibuprofen, Motrin and Aleve over-the-counter.

## 2024-02-26 NOTE — PROGRESS NOTES
The ABCs of the Annual Wellness Visit  Initial Medicare Wellness Visit    Chief Complaint   Patient presents with    Medicare Wellness-subsequent     Subjective   History of Present Illness:  Lou Garcia is a 89 y.o. female who presents for an Initial Medicare Wellness Visit.    The following portions of the patient's history were reviewed and   updated as appropriate: allergies, current medications, past family history, past medical history, past social history, past surgical history, and problem list.     Compared to one year ago, the patient feels her physical   health is better.    Compared to one year ago, the patient feels her mental   health is the same.    Recent Hospitalizations:  She was not admitted to the hospital during the last year.       Current Medical Providers:  Patient Care Team:  Sandra Mckenzie APRN as PCP - General (Family Medicine)    Outpatient Medications Prior to Visit   Medication Sig Dispense Refill    apixaban (Eliquis) 2.5 MG tablet tablet Take 1 tablet by mouth 2 (Two) Times a Day. 180 tablet 1    aspirin 81 MG EC tablet Take 1 tablet by mouth Daily.      losartan (COZAAR) 50 MG tablet Take 1 tablet by mouth Daily. 90 tablet 1    metoprolol succinate XL (TOPROL-XL) 50 MG 24 hr tablet Take 1 tablet by mouth Daily. 90 tablet 1    pantoprazole (PROTONIX) 40 MG EC tablet Take 1 tablet by mouth Every Morning.       No facility-administered medications prior to visit.       No opioid medication identified on active medication list. I have reviewed chart for other potential  high risk medication/s and harmful drug interactions in the elderly.        Aspirin is on active medication list. Aspirin use is indicated based on review of current medical condition/s. Pros and cons of this therapy have been discussed today. Benefits of this medication outweigh potential harm.  Patient has been encouraged to continue taking this medication.  .      Patient Active Problem List   Diagnosis    Primary  "hypertension    Atrial fibrillation    Type 2 diabetes mellitus without complication    Ataxia    Sleep apnea    Magnesium deficiency    Acquired hallux valgus of both feet    Acquired hammertoes of both feet    Arthritis    Diabetes mellitus    Diabetic foot    Diverticulitis    Female stress incontinence    Onychoincurvatum    Onychomycosis    Osteoporosis    Renal cyst    Stenosis of vagina    Tingling of both feet    Urinary tract infectious disease    Stage 3b chronic kidney disease    Overweight (BMI 25.0-29.9)    Initial Medicare annual wellness visit     Advance Care Planning  Advance Directive is not on file.  ACP discussion was held with the patient during this visit. Patient has an advance directive (not in EMR), copy requested.    Review of Systems   Constitutional:  Negative for activity change and fatigue.   HENT:  Negative for dental problem and trouble swallowing.    Eyes:  Negative for visual disturbance.   Respiratory:  Negative for cough, chest tightness, shortness of breath and wheezing.    Cardiovascular:  Negative for chest pain, palpitations and leg swelling.   Gastrointestinal:  Negative for abdominal pain, constipation, diarrhea, nausea and vomiting.   Endocrine: Negative for polydipsia and polyuria.   Genitourinary:  Negative for difficulty urinating and frequency.   Musculoskeletal:  Negative for arthralgias and myalgias.   Skin:  Negative for rash.   Neurological:  Negative for dizziness, weakness, light-headedness and numbness.   Hematological:  Does not bruise/bleed easily.   Psychiatric/Behavioral:  Negative for self-injury, sleep disturbance and suicidal ideas. The patient is not nervous/anxious.         Objective       Vitals:    02/26/24 0851   BP: 116/74   BP Location: Left arm   Patient Position: Sitting   Cuff Size: Adult   Pulse: 82   Resp: 18   Temp: 97.7 °F (36.5 °C)   TempSrc: Temporal   SpO2: 95%   Weight: 63.5 kg (140 lb)   Height: 157.5 cm (62\")     Estimated body mass " "index is 25.61 kg/m² as calculated from the following:    Height as of this encounter: 157.5 cm (62\").    Weight as of this encounter: 63.5 kg (140 lb).    BMI is >= 25 and <30. (Overweight) The following options were offered after discussion;: exercise counseling/recommendations and nutrition counseling/recommendations      Does the patient have evidence of cognitive impairment? No    Physical Exam  Vitals reviewed.   Constitutional:       Appearance: Normal appearance.   HENT:      Head: Normocephalic and atraumatic.      Right Ear: Tympanic membrane, ear canal and external ear normal.      Left Ear: Tympanic membrane, ear canal and external ear normal.      Nose: Nose normal.      Mouth/Throat:      Mouth: Mucous membranes are moist.      Pharynx: Oropharynx is clear.   Eyes:      Conjunctiva/sclera: Conjunctivae normal.      Pupils: Pupils are equal, round, and reactive to light.   Cardiovascular:      Rate and Rhythm: Normal rate and regular rhythm.      Pulses: Normal pulses.           Dorsalis pedis pulses are 1+ on the right side and 1+ on the left side.        Posterior tibial pulses are 1+ on the right side and 1+ on the left side.      Heart sounds: Normal heart sounds.   Pulmonary:      Effort: Pulmonary effort is normal.      Breath sounds: Normal breath sounds.   Abdominal:      General: Bowel sounds are normal.      Palpations: Abdomen is soft.   Genitourinary:     Comments: deferred  Musculoskeletal:         General: Normal range of motion.      Cervical back: Neck supple.   Feet:      Right foot:      Protective Sensation: 6 sites tested.  6 sites sensed.      Skin integrity: Skin integrity normal.      Toenail Condition: Right toenails are normal.      Left foot:      Protective Sensation: 6 sites tested.  6 sites sensed.      Skin integrity: Skin integrity normal.      Toenail Condition: Left toenails are normal.   Skin:     General: Skin is warm and dry.   Neurological:      General: No focal " deficit present.      Mental Status: She is alert and oriented to person, place, and time.   Psychiatric:         Mood and Affect: Mood normal.         Behavior: Behavior normal.         Thought Content: Thought content normal.         Judgment: Judgment normal.        Diabetic Foot Exam Performed and Monofilament Test Performed        HEALTH RISK ASSESSMENT    Smoking Status:  Social History     Tobacco Use   Smoking Status Former    Packs/day: 0.25    Years: 0.50    Additional pack years: 0.00    Total pack years: 0.13    Types: Cigarettes    Quit date:     Years since quittin.1    Passive exposure: Past   Smokeless Tobacco Never     Alcohol Consumption:  Social History     Substance and Sexual Activity   Alcohol Use Not Currently     Fall Risk Screen:    [facility] Fall Risk Assessment was completed, and patient is at LOW risk for falls.Assessment completed on:2024    Depression Screen:       2024     8:56 AM   PHQ-2/PHQ-9 Depression Screening   Little Interest or Pleasure in Doing Things 0-->not at all   Feeling Down, Depressed or Hopeless 0-->not at all   PHQ-9: Brief Depression Severity Measure Score 0       Health Habits and Functional and Cognitive Screenin/26/2024     8:56 AM   Functional & Cognitive Status   Do you have difficulty preparing food and eating? No   Do you have difficulty bathing yourself, getting dressed or grooming yourself? No   Do you have difficulty using the toilet? No   Do you have difficulty moving around from place to place? No   Do you have trouble with steps or getting out of a bed or a chair? No   Current Diet Well Balanced Diet   Dental Exam Up to date   Eye Exam Up to date   Exercise (times per week) 7 times per week   Current Exercises Include Walking   Do you need help using the phone?  No   Do you need help to go to places out of walking distance? No   Do you need help shopping? No   Do you need help preparing meals?  No   Do you need help with  housework?  No   Do you need help with laundry? No   Do you need help taking your medications? No   Do you need help managing money? No   Do you ever drive or ride in a car without wearing a seat belt? No   Have you felt unusual stress, anger or loneliness in the last month? No   Who do you live with? Spouse   If you need help, do you have trouble finding someone available to you? No   Have you been bothered in the last four weeks by sexual problems? No   Do you have difficulty concentrating, remembering or making decisions? No       Age-appropriate Screening Schedule:  Refer to the list below for future screening recommendations based on patient's age, sex and/or medical conditions. Orders for these recommended tests are listed in the plan section. The patient has been provided with a written plan.    Health Maintenance   Topic Date Due    URINE MICROALBUMIN  Never done    DXA SCAN  Never done    ANNUAL WELLNESS VISIT  Never done    HEMOGLOBIN A1C  06/30/2023    ZOSTER VACCINE (2 of 2) 02/26/2024 (Originally 5/27/2019)    COVID-19 Vaccine (6 - 2023-24 season) 02/28/2024 (Originally 9/1/2023)    RSV Vaccine - Adults (1 - 1-dose 60+ series) 02/26/2025 (Originally 11/14/1992)    Pneumococcal Vaccine 65+ (2 of 2 - PCV) 02/26/2025 (Originally 10/28/2021)    TDAP/TD VACCINES (1 - Tdap) 02/26/2025 (Originally 11/14/1951)    DIABETIC EYE EXAM  07/12/2024    BMI FOLLOWUP  02/26/2025    INFLUENZA VACCINE  Completed            Assessment & Plan   CMS Preventative Services Quick Reference  Risk Factors Identified During Encounter  None Identified  The above risks/problems have been discussed with the patient.  Follow up actions/plans if indicated are seen below in the Assessment/Plan Section.  Pertinent information has been shared with the patient in the After Visit Summary.    Diagnoses and all orders for this visit:    1. Paroxysmal atrial fibrillation (Primary)  Assessment & Plan:  Patient continues to be followed by  cardiology, Dr. Parker's office.  Rate currently very well-controlled with metoprolol XL 50 mg once daily.  She continues to utilize Eliquis 2.5 mg twice daily.  She has no complaints with high heart rate or palpitations.      2. Primary hypertension  Assessment & Plan:  Blood pressure exhibiting excellent control on current medication, 116/74 in office today.  She states she will intermittently check her blood pressure with excellent readings as well.      3. Type 2 diabetes mellitus with hyperglycemia, without long-term current use of insulin  Assessment & Plan:  Patient is very diligent in regards to diet modifications to control her diabetes, A1c in office today is 6.1%.  Patient checks a.m. fasting glucose as well as nightly glucose, always under 180.  She is up-to-date on eye exam without signs of retinopathy.  Foot check conducted in office today within normal limits.  Patient currently not utilizing any medications, controlling solely with dietary modification.      4. Stage 3b chronic kidney disease  Assessment & Plan:  Patient with longstanding stage IIIb chronic kidney disease, most recent creatinine 1.4, GFR 37.  Microalbumin 99.1mcg/mL  Blood pressure and diabetes management are crucial for retaining current renal function.  Also discussed avoiding nephrotoxic agents such as ibuprofen, Motrin and Aleve over-the-counter.      5. Overweight (BMI 25.0-29.9)  Assessment & Plan:  Patient's (Body mass index is 25.61 kg/m².) indicates that they are overweight with health conditions that include hypertension, diabetes mellitus, and GERD . Weight is unchanged. BMI is is above average; BMI management plan is completed. We discussed portion control and increasing exercise.       6. Initial Medicare annual wellness visit        Follow Up:  Return in about 3 months (around 5/26/2024) for Next scheduled follow up.     An After Visit Summary and PPPS were made available to the patient.

## 2024-02-26 NOTE — ASSESSMENT & PLAN NOTE
Patient's (Body mass index is 25.61 kg/m².) indicates that they are overweight with health conditions that include hypertension, diabetes mellitus, and GERD . Weight is unchanged. BMI is is above average; BMI management plan is completed. We discussed portion control and increasing exercise.

## 2024-02-26 NOTE — ASSESSMENT & PLAN NOTE
Blood pressure exhibiting excellent control on current medication, 116/74 in office today.  She states she will intermittently check her blood pressure with excellent readings as well.

## 2024-03-07 ENCOUNTER — OFFICE VISIT (OUTPATIENT)
Dept: FAMILY MEDICINE CLINIC | Facility: CLINIC | Age: 89
End: 2024-03-07
Payer: MEDICARE

## 2024-03-07 VITALS
OXYGEN SATURATION: 96 % | DIASTOLIC BLOOD PRESSURE: 76 MMHG | BODY MASS INDEX: 25.4 KG/M2 | WEIGHT: 138 LBS | HEART RATE: 56 BPM | SYSTOLIC BLOOD PRESSURE: 138 MMHG | TEMPERATURE: 98.2 F | HEIGHT: 62 IN | RESPIRATION RATE: 16 BRPM

## 2024-03-07 DIAGNOSIS — H93.13 TINNITUS OF BOTH EARS: Primary | ICD-10-CM

## 2024-03-07 PROCEDURE — 1160F RVW MEDS BY RX/DR IN RCRD: CPT | Performed by: NURSE PRACTITIONER

## 2024-03-07 PROCEDURE — 1159F MED LIST DOCD IN RCRD: CPT | Performed by: NURSE PRACTITIONER

## 2024-03-07 PROCEDURE — 99213 OFFICE O/P EST LOW 20 MIN: CPT | Performed by: NURSE PRACTITIONER

## 2024-03-07 RX ORDER — MECLIZINE HCL 12.5 MG/1
12.5 TABLET ORAL 3 TIMES DAILY PRN
Qty: 20 TABLET | Refills: 0 | Status: SHIPPED | OUTPATIENT
Start: 2024-03-07

## 2024-03-07 NOTE — PROGRESS NOTES
Office Note     Name: Lou Garcia    : 1932     MRN: [id number]     Chief Complaint  ringing in her head since friday    Subjective     History of Present Illness:  Lou Garcia is a 89 y.o. female who presents today for complaints of ringing in both her ears for the last 4 days.  Patient was diagnosed with tendinitis, underwent thorough evaluation approximately 5 years ago.  This was under the care of her previous PCP, Dr. Reese Mcnulty.  She was referred to ENT, Dr. Pacheco for further evaluation.  Patient was confirmed to have tinnitus bilaterally.  She states the ringing in her ears is bilateral in nature, it has always been intermittent but constant in the last 4 days giving her cause for concern.  He has no additional complaints such as ear pain, dizziness, headache or vertigo.  Pressure is well-controlled, 138/76.  She just had her annual well visit a couple of weeks ago with normal findings.  She has no further complaints or concerns today    Review of Systems   Constitutional:  Negative for fatigue.   HENT:  Positive for tinnitus. Negative for congestion, ear pain, sinus pressure and sore throat.    Respiratory:  Negative for cough, chest tightness, shortness of breath and wheezing.    Cardiovascular:  Negative for chest pain, palpitations and leg swelling.   Gastrointestinal:  Negative for abdominal pain, constipation, diarrhea, nausea and vomiting.   Neurological:  Negative for dizziness, tremors, syncope, weakness, light-headedness, numbness, headache and memory problem.       Objective     Past Medical History:   Diagnosis Date    Cataracts, bilateral     Nonrheumatic mitral (valve) insufficiency     AKASH (obstructive sleep apnea)     : BIPAP    Other long term (current) drug therapy     Type 2 diabetes mellitus without complication      Past Surgical History:   Procedure Laterality Date    CATARACT EXTRACTION      CHOLECYSTECTOMY       Family History   Problem Relation Age of Onset     "Coronary artery disease Mother     Sleep apnea Mother     Cancer Father     Cancer Sister     Sleep apnea Sister     Coronary artery disease Sister     Heart disease Brother     Diabetes Brother     Heart disease Brother        Vital Signs  /76 (BP Location: Left arm, Patient Position: Sitting, Cuff Size: Adult)   Pulse 56   Temp 98.2 °F (36.8 °C) (Temporal)   Resp 16   Ht 157.5 cm (62\")   Wt 62.6 kg (138 lb)   SpO2 96%   BMI 25.24 kg/m²   Estimated body mass index is 25.24 kg/m² as calculated from the following:    Height as of this encounter: 157.5 cm (62\").    Weight as of this encounter: 62.6 kg (138 lb).  Facility age limit for growth %darlene is 20 years.    Physical Exam  Vitals reviewed.   Constitutional:       Appearance: Normal appearance.   HENT:      Head: Normocephalic and atraumatic.      Right Ear: Tympanic membrane, ear canal and external ear normal.      Left Ear: Tympanic membrane, ear canal and external ear normal.      Ears:      Chan exam findings: Does not lateralize.     Nose: Nose normal.      Mouth/Throat:      Mouth: Mucous membranes are moist.      Pharynx: Oropharynx is clear.   Eyes:      Conjunctiva/sclera: Conjunctivae normal.      Pupils: Pupils are equal, round, and reactive to light.   Cardiovascular:      Rate and Rhythm: Normal rate and regular rhythm.      Pulses: Normal pulses.      Heart sounds: Normal heart sounds.   Pulmonary:      Effort: Pulmonary effort is normal.      Breath sounds: Normal breath sounds.   Musculoskeletal:         General: Normal range of motion.   Skin:     General: Skin is warm and dry.   Neurological:      Mental Status: She is alert and oriented to person, place, and time.             POCT Results (if applicable):  Results for orders placed or performed in visit on 12/27/23   Adult Transthoracic Echo Complete W/ Cont if Necessary Per Protocol   Result Value Ref Range    EF(MOD-bp) 66.0 %    LVIDd 3.9 cm    LVIDs 2.34 cm    IVSd 1.15 cm    " LVPWd 1.14 cm    FS 40.3 %    IVS/LVPW 1.01 cm    ESV(cubed) 12.8 ml    EDV(cubed) 60.2 ml    LV mass(C)d 149.7 grams    LVOT area 3.5 cm2    LVOT diam 2.10 cm    EDV(MOD-sp2) 82.5 ml    EDV(MOD-sp4) 66.3 ml    ESV(MOD-sp2) 26.9 ml    ESV(MOD-sp4) 21.2 ml    SV(MOD-sp2) 55.6 ml    SV(MOD-sp4) 45.1 ml    EF(MOD-sp2) 67.4 %    EF(MOD-sp4) 68.0 %    MV E max adriano 117.0 cm/sec    MV A max adriano 149.0 cm/sec    MV dec time 0.32 sec    MV E/A 0.79     Med Peak E' Adriano 5.9 cm/sec    Lat Peak E' Adriano 6.0 cm/sec    Avg E/e' ratio 19.66     SV(LVOT) 77.6 ml    RVIDd 2.05 cm    TAPSE (>1.6) 2.16 cm    LA dimension (2D)  3.2 cm    LV V1 max 109.0 cm/sec    LV V1 max PG 4.8 mmHg    LV V1 mean PG 3.0 mmHg    LV V1 VTI 22.4 cm    Ao pk adriano 157.0 cm/sec    Ao max PG 9.9 mmHg    Ao mean PG 6.0 mmHg    Ao V2 VTI 33.2 cm    BRENNEN(I,D) 2.34 cm2    MV max PG 9.4 mmHg    MV mean PG 3.0 mmHg    MV V2 VTI 41.5 cm    MV P1/2t 96.3 msec    MVA(P1/2t) 2.28 cm2    MVA(VTI) 1.87 cm2    MV dec slope 340.5 cm/sec2    PA V2 max 111.0 cm/sec    Ao root diam 3.3 cm    Sinus 3.4 cm    Dimensionless Index 0.67 (DI)            Assessment and Plan     Diagnoses and all orders for this visit:    1. Tinnitus of both ears (Primary)  Assessment & Plan:  presents today for complaints of ringing in both her ears for the last 4 days.  Patient was diagnosed with tendinitis, underwent thorough evaluation approximately 5 years ago.  This was under the care of her previous PCP, Dr. Reese Mcnulty.  She was referred to ENT, Dr. Pacheco for further evaluation.  Patient was confirmed to have tinnitus bilaterally.  She states the ringing in her ears is bilateral in nature, it has always been intermittent but constant in the last 4 days giving her cause for concern.  He has no additional complaints such as ear pain, dizziness, headache or vertigo.  Pressure is well-controlled, 138/76.  She just had her annual well visit a couple of weeks ago with normal findings.  No findings on  today's exam as well.  She was given meclizine in the past for tinnitus which best she can recall did help somewhat.  She would like to be referred to ENT for repeat evaluation.  -Prescription for meclizine sent.  Also placed referral to Dr. Pacheco for further evaluation and treatment    Orders:  -     Ambulatory Referral to ENT (Otolaryngology)  -     meclizine (ANTIVERT) 12.5 MG tablet; Take 1 tablet by mouth 3 (Three) Times a Day As Needed for Dizziness.  Dispense: 20 tablet; Refill: 0               Follow Up  No follow-ups on file.    Sandra Mckenzie, APRN

## 2024-03-08 NOTE — ASSESSMENT & PLAN NOTE
presents today for complaints of ringing in both her ears for the last 4 days.  Patient was diagnosed with tendinitis, underwent thorough evaluation approximately 5 years ago.  This was under the care of her previous PCP, Dr. Reese Mcnulty.  She was referred to ENT, Dr. Pacheco for further evaluation.  Patient was confirmed to have tinnitus bilaterally.  She states the ringing in her ears is bilateral in nature, it has always been intermittent but constant in the last 4 days giving her cause for concern.  He has no additional complaints such as ear pain, dizziness, headache or vertigo.  Pressure is well-controlled, 138/76.  She just had her annual well visit a couple of weeks ago with normal findings.  No findings on today's exam as well.  She was given meclizine in the past for tinnitus which best she can recall did help somewhat.  She would like to be referred to ENT for repeat evaluation.  -Prescription for meclizine sent.  Also placed referral to Dr. Pacheco for further evaluation and treatment

## 2024-05-09 ENCOUNTER — OFFICE VISIT (OUTPATIENT)
Dept: FAMILY MEDICINE CLINIC | Facility: CLINIC | Age: 89
End: 2024-05-09
Payer: MEDICARE

## 2024-05-09 VITALS
OXYGEN SATURATION: 98 % | SYSTOLIC BLOOD PRESSURE: 122 MMHG | DIASTOLIC BLOOD PRESSURE: 76 MMHG | WEIGHT: 136 LBS | RESPIRATION RATE: 18 BRPM | HEIGHT: 62 IN | TEMPERATURE: 98.3 F | HEART RATE: 84 BPM | BODY MASS INDEX: 25.03 KG/M2

## 2024-05-09 DIAGNOSIS — E11.9 TYPE 2 DIABETES MELLITUS WITHOUT COMPLICATION, WITHOUT LONG-TERM CURRENT USE OF INSULIN: Primary | ICD-10-CM

## 2024-05-09 DIAGNOSIS — H93.13 TINNITUS OF BOTH EARS: ICD-10-CM

## 2024-05-09 DIAGNOSIS — I10 PRIMARY HYPERTENSION: ICD-10-CM

## 2024-05-09 DIAGNOSIS — R30.0 DYSURIA: ICD-10-CM

## 2024-05-09 DIAGNOSIS — N30.00 ACUTE CYSTITIS WITHOUT HEMATURIA: ICD-10-CM

## 2024-05-09 LAB
BILIRUB BLD-MCNC: NEGATIVE MG/DL
CLARITY, POC: ABNORMAL
COLOR UR: ABNORMAL
EXPIRATION DATE: ABNORMAL
GLUCOSE UR STRIP-MCNC: NEGATIVE MG/DL
HBA1C MFR BLD: 6 % (ref 4.5–5.7)
KETONES UR QL: NEGATIVE
LEUKOCYTE EST, POC: ABNORMAL
Lab: ABNORMAL
NITRITE UR-MCNC: POSITIVE MG/ML
PH UR: 6 [PH] (ref 5–8)
PROT UR STRIP-MCNC: NEGATIVE MG/DL
RBC # UR STRIP: NEGATIVE /UL
SP GR UR: 1.01 (ref 1–1.03)
UROBILINOGEN UR QL: ABNORMAL

## 2024-05-09 PROCEDURE — 1159F MED LIST DOCD IN RCRD: CPT | Performed by: NURSE PRACTITIONER

## 2024-05-09 PROCEDURE — 81002 URINALYSIS NONAUTO W/O SCOPE: CPT | Performed by: NURSE PRACTITIONER

## 2024-05-09 PROCEDURE — 3044F HG A1C LEVEL LT 7.0%: CPT | Performed by: NURSE PRACTITIONER

## 2024-05-09 PROCEDURE — 99214 OFFICE O/P EST MOD 30 MIN: CPT | Performed by: NURSE PRACTITIONER

## 2024-05-09 PROCEDURE — 83036 HEMOGLOBIN GLYCOSYLATED A1C: CPT | Performed by: NURSE PRACTITIONER

## 2024-05-09 PROCEDURE — 1160F RVW MEDS BY RX/DR IN RCRD: CPT | Performed by: NURSE PRACTITIONER

## 2024-05-09 RX ORDER — NITROFURANTOIN 25; 75 MG/1; MG/1
100 CAPSULE ORAL 2 TIMES DAILY
Qty: 10 CAPSULE | Refills: 0 | Status: SHIPPED | OUTPATIENT
Start: 2024-05-09

## 2024-05-23 DIAGNOSIS — I48.0 PAROXYSMAL ATRIAL FIBRILLATION: ICD-10-CM

## 2024-06-07 DIAGNOSIS — I48.0 PAROXYSMAL ATRIAL FIBRILLATION: ICD-10-CM

## 2024-06-07 RX ORDER — METOPROLOL SUCCINATE 50 MG/1
50 TABLET, EXTENDED RELEASE ORAL DAILY
Qty: 90 TABLET | Refills: 1 | Status: SHIPPED | OUTPATIENT
Start: 2024-06-07

## 2024-06-21 DIAGNOSIS — I48.0 PAROXYSMAL ATRIAL FIBRILLATION: ICD-10-CM

## 2024-06-28 ENCOUNTER — OFFICE VISIT (OUTPATIENT)
Dept: CARDIOLOGY | Facility: CLINIC | Age: 89
End: 2024-06-28
Payer: MEDICARE

## 2024-06-28 VITALS
HEART RATE: 72 BPM | OXYGEN SATURATION: 96 % | SYSTOLIC BLOOD PRESSURE: 134 MMHG | WEIGHT: 134 LBS | DIASTOLIC BLOOD PRESSURE: 70 MMHG | HEIGHT: 62 IN | BODY MASS INDEX: 24.66 KG/M2

## 2024-06-28 DIAGNOSIS — G47.33 OBSTRUCTIVE SLEEP APNEA SYNDROME: Chronic | ICD-10-CM

## 2024-06-28 DIAGNOSIS — I10 PRIMARY HYPERTENSION: Chronic | ICD-10-CM

## 2024-06-28 DIAGNOSIS — I48.0 PAROXYSMAL ATRIAL FIBRILLATION: Primary | Chronic | ICD-10-CM

## 2024-06-28 NOTE — ASSESSMENT & PLAN NOTE
Well-controlled with metoprolol.  Patient on Eliquis 2.5 and aspirin 81 mg.  Patient to continue current medications    Recent ER visit with elevated blood pressure patient complaining of a sensation of palpitations.  Patient was noted to have intermittent PVCs otherwise normal sinus rhythm.

## 2024-06-28 NOTE — PROGRESS NOTES
[facility] Provider Note     Date:   2024  Name: Lou Garcia  :   1932  PCP: Sandra Mckenzie APRN    Chief Complaint   Patient presents with    Sleep Apnea    Atrial Fibrillation       Subjective     History of Present Illness  Lou Garcia is a 89 y.o. female who presents today for follow-up ER visit.    Patient was seen in the ER at [facility] 2024.  Patient stated that she had high blood pressure with a sensation of palpitation.  Patient was noted to have intermittent PVCs and elevated systolic blood pressure 177.  Patient's blood pressure in the office today 134/70.  Patient states that she does not feel bad she continues to do light housework.  She does have a caregiver.  Patient denies any shortness of breath or chest pain.  She states she has some occasional palpitations.  Her A-fib is well-controlled on metoprolol.  She is on Eliquis 2.5 mg as well as aspirin 81 mg.  Patient denies any edema, presyncope or syncope.          Cardiology and sleep related problem list    AKASH- on 2006; [facility]    Titration study 16 titrated to Bipap 16/10cm    Current settings: Abipap 20/10 cm  Diabetes  Anxiety  Hypertension  A-fib  Former smoker    2023 echo-EF 66%, left ventricular wall thickness is consistent with mild concentric hypertrophy, grade 1 diastolic dysfunction, mild mitral valve stenosis is present with functional MAC, mild mitral valve regurgitation.    2021 echo- normal EF, mild concentric left ventricular hypertrophy, mild to moderate MR, abnormal diastolic function, mild mitral stenosis, mild tricuspid regurgitation, mild pulmonary hypertension.    2022 Holter     Reports Denies   Chest Pain [] [x]   Shortness of Air [] [x]   Palpitations [x]Occassional []   Edema [] [x]   Dizziness [] [x]   Syncope [] [x]         Current mask used is NP    Device Functioning Well: Yes  Mask Fit Comfortable: Yes  Air Flow Comfortable:  Yes  DME Helpful for Supplies: Yes  Sleep is rested:   Yes        Device Download:               No Known Allergies    Current Outpatient Medications:     apixaban (Eliquis) 2.5 MG tablet tablet, Take 1 tablet by mouth 2 (Two) Times a Day., Disp: 180 tablet, Rfl: 1    aspirin 81 MG EC tablet, Take 1 tablet by mouth Daily., Disp: , Rfl:     losartan (COZAAR) 50 MG tablet, Take 1 tablet by mouth Daily., Disp: 90 tablet, Rfl: 1    meclizine (ANTIVERT) 12.5 MG tablet, Take 1 tablet by mouth 3 (Three) Times a Day As Needed for Dizziness., Disp: 20 tablet, Rfl: 0    metoprolol succinate XL (TOPROL-XL) 50 MG 24 hr tablet, Take 1 tablet by mouth Daily., Disp: 90 tablet, Rfl: 1    pantoprazole (PROTONIX) 40 MG EC tablet, Take 1 tablet by mouth Every Morning., Disp: , Rfl:     Past Medical History:   Diagnosis Date    Cataracts, bilateral     Nonrheumatic mitral (valve) insufficiency     AKASH (obstructive sleep apnea)     : BIPAP    Other long term (current) drug therapy     Type 2 diabetes mellitus without complication       Past Surgical History:   Procedure Laterality Date    CATARACT EXTRACTION      CHOLECYSTECTOMY       Family History   Problem Relation Age of Onset    Coronary artery disease Mother     Sleep apnea Mother     Cancer Father     Cancer Sister     Sleep apnea Sister     Coronary artery disease Sister     Heart disease Brother     Diabetes Brother     Heart disease Brother      Social History     Socioeconomic History    Marital status:     Number of children: 1   Tobacco Use    Smoking status: Former     Current packs/day: 0.00     Average packs/day: 0.3 packs/day for 0.5 years (0.1 ttl pk-yrs)     Types: Cigarettes     Start date: 1994     Quit date:      Years since quittin.5     Passive exposure: Past    Smokeless tobacco: Never   Vaping Use    Vaping status: Never Used   Substance and Sexual Activity    Alcohol use: Not Currently    Drug use: Not Currently    Sexual activity:  "Defer       Objective     Vital Signs:  /70   Pulse 72   Ht 157.5 cm (62\")   Wt 60.8 kg (134 lb)   SpO2 96%   BMI 24.51 kg/m²   Estimated body mass index is 24.51 kg/m² as calculated from the following:    Height as of this encounter: 157.5 cm (62\").    Weight as of this encounter: 60.8 kg (134 lb).       BMI is within normal parameters. No other follow-up for BMI required.      Physical Exam  Constitutional:       Appearance: Normal appearance.   Cardiovascular:      Rate and Rhythm: Normal rate and regular rhythm.      Pulses: Normal pulses.      Heart sounds: Normal heart sounds.   Musculoskeletal:      Right lower leg: No edema.      Left lower leg: No edema.   Skin:     General: Skin is warm and dry.      Capillary Refill: Capillary refill takes less than 2 seconds.   Neurological:      General: No focal deficit present.      Mental Status: She is alert and oriented to person, place, and time.   Psychiatric:         Mood and Affect: Mood normal.         Behavior: Behavior normal.         The following data was reviewed by: SHELBY Llanes on 06/28/2024:  Labs from 6/20/2024 from [facility] have been reviewed    ER records 6/20/2024 have been reviewed    30-day download 5/21/2024 to 6/19/2024 I have reviewed interpret the data from the download.  Download shows good compliance and control with an AHI of 4.  Patient continues to have an extensive leak.  She states that she did not get the headgear that she normally gets.  She is due for new headgear and will contact [facility].  She states that her sleep is well rested.  She is receiving benefit from PAP therapy.  Plan to continue current treatment.      ECG 12 Lead    Date/Time: 6/28/2024 4:58 PM  Performed by: Charo Means APRN    Authorized by: Charo Means APRN  Comparison: compared with previous ECG from 3/8/2023  Comparison to previous ECG: Sinus rhythm  Consistent with right ventricular conduction delay  Rhythm: sinus rhythm  Rhythm " comments: First-degree AV block  Rate: normal  BPM: 72  Conduction: 1st degree AV block  Q waves: I and II    T Waves: T waves normal  QRS axis: normal    Clinical impression: abnormal EKG  Comments: Possible right ventricular conduction delay  Possible septal myocardial infarct Q wave in V1 and V2 probably old           Assessment and Plan     Diagnoses and all orders for this visit:    1. Paroxysmal atrial fibrillation (Primary)  Assessment & Plan:  Well-controlled with metoprolol.  Patient on Eliquis 2.5 and aspirin 81 mg.  Patient to continue current medications    Recent ER visit with elevated blood pressure patient complaining of a sensation of palpitations.  Patient was noted to have intermittent PVCs otherwise normal sinus rhythm.    Orders:  -     ECG 12 Lead    2. Obstructive sleep apnea syndrome  Assessment & Plan:  Patient has a baseline .  This is severe sleep apnea.  Download shows good compliance and control with an AHI of 4.  Airflow and mask fit is comfortable.  She continues to have a air leak.  Patient states she did not get the headgear that she normally gets.  She will contact [facility].  Patient is receiving benefit from PAP therapy.  Plan to continue.      3. Primary hypertension  Assessment & Plan:  Hypertension is stable and controlled  Continue current treatment regimen.  Blood pressure will be reassessed in 6 months.    Patient on losartan, metoprolol, aspirin, and Eliquis.            Recommendations: ER if symptoms increase, Report if any new/changing symptoms immediately, Sleep risks reviewed (driving, medical, sleep death, sedating agents), and Sleep hygiene discussed          Follow Up  Return in about 5 months (around 11/28/2024) for AKASH/ Afib.  Patient was given instructions and counseling regarding her condition or for health maintenance advice. Please see specific information pulled into the AVS if appropriate.

## 2024-06-28 NOTE — ASSESSMENT & PLAN NOTE
Patient has a baseline .  This is severe sleep apnea.  Download shows good compliance and control with an AHI of 4.  Airflow and mask fit is comfortable.  She continues to have a air leak.  Patient states she did not get the headgear that she normally gets.  She will contact [facility].  Patient is receiving benefit from PAP therapy.  Plan to continue.

## 2024-06-28 NOTE — ASSESSMENT & PLAN NOTE
Hypertension is stable and controlled  Continue current treatment regimen.  Blood pressure will be reassessed in 6 months.    Patient on losartan, metoprolol, aspirin, and Eliquis.

## 2024-07-05 RX ORDER — LOSARTAN POTASSIUM 50 MG/1
50 TABLET ORAL DAILY
Qty: 90 TABLET | Refills: 1 | Status: SHIPPED | OUTPATIENT
Start: 2024-07-05

## 2024-07-17 ENCOUNTER — TELEPHONE (OUTPATIENT)
Dept: CARDIOLOGY | Facility: CLINIC | Age: 89
End: 2024-07-17
Payer: MEDICARE

## 2024-07-17 NOTE — TELEPHONE ENCOUNTER
Patient called in states that she went to ER for her Afib everything checked out okay but they did ask her why she was not Diltiazem anymore and she stated she was taking eliquis now. I will pull ER records and put in chart for review.

## 2024-07-17 NOTE — TELEPHONE ENCOUNTER
ER notes in chart patient just called back with CPAP issues gonna bring all stuff in next week on M-T-W so I will be here to look at it

## 2024-07-19 ENCOUNTER — OFFICE VISIT (OUTPATIENT)
Dept: CARDIOLOGY | Facility: CLINIC | Age: 89
End: 2024-07-19
Payer: MEDICARE

## 2024-07-19 DIAGNOSIS — G47.33 OBSTRUCTIVE SLEEP APNEA SYNDROME: Chronic | ICD-10-CM

## 2024-07-19 DIAGNOSIS — I48.0 PAROXYSMAL ATRIAL FIBRILLATION: ICD-10-CM

## 2024-07-19 DIAGNOSIS — R00.2 PALPITATIONS: Primary | ICD-10-CM

## 2024-07-19 DIAGNOSIS — I10 PRIMARY HYPERTENSION: Chronic | ICD-10-CM

## 2024-07-19 PROCEDURE — 99214 OFFICE O/P EST MOD 30 MIN: CPT | Performed by: NURSE PRACTITIONER

## 2024-07-22 VITALS
HEART RATE: 66 BPM | DIASTOLIC BLOOD PRESSURE: 87 MMHG | HEIGHT: 62 IN | WEIGHT: 134 LBS | SYSTOLIC BLOOD PRESSURE: 130 MMHG | BODY MASS INDEX: 24.66 KG/M2 | OXYGEN SATURATION: 94 %

## 2024-07-23 PROBLEM — R00.2 PALPITATIONS: Status: ACTIVE | Noted: 2024-07-23

## 2024-07-23 NOTE — ASSESSMENT & PLAN NOTE
Patient has a baseline AHI of 106. She is on Bipap therapy and compliant with her Bipap.  Patient is receiving benefit from pap therapy.  Plan to continue.

## 2024-07-23 NOTE — PROGRESS NOTES
"    [facility] Provider Note     Date:   2024  Name: Lou Garcia  :   1932  PCP: Sandra Mckenzie APRN    Chief Complaint   Patient presents with    Palpitations     \"Heart fluttering\"- follow up after being in [facility] ER (patient was seen on 24 and charting entered at later date due to downtime- roomed by Garland Aldana MA)       Subjective     History of Present Illness  Lou Garcia is a 89 y.o. female who presents today with reports of her heart fluttering.      Patient is here with her daughter who is her caregiver.  Patient has a history of afib.  EKG from [facility] has been reviewed and no Afib was found on telemetry in the ER.  Patient states that she can feel her heart fluttering.  She states she has had a few episodes while sitting in the office.  Patient has been on eliquis 2.5 mg renal dosing for DVT.  She states she is not short of breath nor does she has any CP.  She states she mainly has the episode in the mornings after being on her pap machine.          Cardiology and sleep related problem list    AKASH- on 2006; [facility]    Titration study 16 titrated to Bipap 16/10cm    Current settings: Abipap 20/10 cm  Diabetes  Anxiety  Hypertension  A-fib  Former smoker    2023 echo-EF 66%, left ventricular wall thickness is consistent with mild concentric hypertrophy, grade 1 diastolic dysfunction, mild mitral valve stenosis is present with functional MAC, mild mitral valve regurgitation.    2021 echo- normal EF, mild concentric left ventricular hypertrophy, mild to moderate MR, abnormal diastolic function, mild mitral stenosis, mild tricuspid regurgitation, mild pulmonary hypertension.    2022 Holter     Reports Denies   Chest Pain [] [x]   Shortness of Air [] [x]   Palpitations [x] []   Edema [] [x]   Dizziness [] [x]   Syncope [] [x]         No Known Allergies    Current Outpatient Medications:     apixaban (Eliquis) 2.5 MG " "tablet tablet, Take 1 tablet by mouth 2 (Two) Times a Day., Disp: 180 tablet, Rfl: 1    aspirin 81 MG EC tablet, Take 1 tablet by mouth Daily., Disp: , Rfl:     losartan (COZAAR) 50 MG tablet, Take 1 tablet by mouth Daily., Disp: 90 tablet, Rfl: 1    meclizine (ANTIVERT) 12.5 MG tablet, Take 1 tablet by mouth 3 (Three) Times a Day As Needed for Dizziness., Disp: 20 tablet, Rfl: 0    metoprolol succinate XL (TOPROL-XL) 50 MG 24 hr tablet, Take 1 tablet by mouth Daily., Disp: 90 tablet, Rfl: 1    pantoprazole (PROTONIX) 40 MG EC tablet, Take 1 tablet by mouth Every Morning., Disp: , Rfl:     Past Medical History:   Diagnosis Date    Cataracts, bilateral     Nonrheumatic mitral (valve) insufficiency     AKASH (obstructive sleep apnea)     : BIPAP    Other long term (current) drug therapy     Type 2 diabetes mellitus without complication       Past Surgical History:   Procedure Laterality Date    CATARACT EXTRACTION      CHOLECYSTECTOMY       Family History   Problem Relation Age of Onset    Coronary artery disease Mother     Sleep apnea Mother     Cancer Father     Cancer Sister     Sleep apnea Sister     Coronary artery disease Sister     Heart disease Brother     Diabetes Brother     Heart disease Brother      Social History     Socioeconomic History    Marital status:     Number of children: 1   Tobacco Use    Smoking status: Former     Current packs/day: 0.00     Average packs/day: 0.3 packs/day for 0.5 years (0.1 ttl pk-yrs)     Types: Cigarettes     Start date: 1994     Quit date:      Years since quittin.5     Passive exposure: Past    Smokeless tobacco: Never   Vaping Use    Vaping status: Never Used   Substance and Sexual Activity    Alcohol use: Not Currently    Drug use: Not Currently    Sexual activity: Defer       Objective     Vital Signs:  /87 (Patient Position: Sitting, Cuff Size: Adult)   Pulse 66   Ht 157.5 cm (62\")   Wt 60.8 kg (134 lb)   SpO2 94%   BMI 24.51 kg/m² " "  Estimated body mass index is 24.51 kg/m² as calculated from the following:    Height as of this encounter: 157.5 cm (62\").    Weight as of this encounter: 60.8 kg (134 lb).       BMI is within normal parameters. No other follow-up for BMI required.      Physical Exam  Constitutional:       Appearance: Normal appearance.   Cardiovascular:      Rate and Rhythm: Normal rate and regular rhythm.      Pulses: Normal pulses.      Heart sounds: Normal heart sounds.   Pulmonary:      Effort: Pulmonary effort is normal.      Breath sounds: Normal breath sounds.   Musculoskeletal:      Right lower leg: No edema.      Left lower leg: No edema.   Skin:     General: Skin is warm.      Capillary Refill: Capillary refill takes less than 2 seconds.   Neurological:      General: No focal deficit present.      Mental Status: She is alert and oriented to person, place, and time.   Psychiatric:         Mood and Affect: Mood normal.         Behavior: Behavior normal.         The following data was reviewed by: SHELBY Llanes on 07/19/2024:  Labs from ER: CBC, CMP, Troponin, TSH, and Mag have been reviewed.  [facility] ER note has been reviewed from 7/16/24.          Assessment and Plan     Diagnoses and all orders for this visit:    1. Palpitations (Primary)  Assessment & Plan:  Patient reports increased palpitations that she notices more in the morning after having worn her PAP device.  Recent ER visit 7/16/2024 troponins were negative and EKG was negative for A-fib.  Patient does report she has anxiety.  She states that the palpitations are more frequent when she gets upset.  Will plan 14-day Holter monitor to evaluate for A-fib.  Patient on renal dosing of Eliquis 2.5 mg twice a day for her history of DVT/TIA    Orders:  -     Cancel: Holter Monitor - 48 Hour  -     Holter Monitor - 48 Hour    2. Primary hypertension  Assessment & Plan:  Hypertension is stable and controlled  Continue current treatment regimen.  Blood " pressure will be reassessed in 6 months.    Patient on losartan and metoprolol for blood pressure control.  Patient to continue current medications.      3. Obstructive sleep apnea syndrome  Assessment & Plan:  Patient has a baseline AHI of 106. She is on Bipap therapy and compliant with her Bipap.  Patient is receiving benefit from pap therapy.  Plan to continue.      4. Paroxysmal atrial fibrillation  Assessment & Plan:  Patient has a history of Afib and was on Diltiazem.  Patient states that at some point she was taken off the Diltiazem, she does not know why, and has just been on Metoprolol.  The palpitations have been controlled for some time and now have returned.  Plan to restart Diltiazem XL 180mg daily after she has completed her heart monitor.          Recommendations: ER if symptoms increase, Report if any new/changing symptoms immediately, Limit salt, Sleep risks reviewed (driving, medical, sleep death, sedating agents), and Sleep hygiene discussed          Follow Up  No follow-ups on file.  Patient was given instructions and counseling regarding her condition or for health maintenance advice. Please see specific information pulled into the AVS if appropriate.

## 2024-07-23 NOTE — ASSESSMENT & PLAN NOTE
Patient reports increased palpitations that she notices more in the morning after having worn her PAP device.  Recent ER visit 7/16/2024 troponins were negative and EKG was negative for A-fib.  Patient does report she has anxiety.  She states that the palpitations are more frequent when she gets upset.  Will plan 14-day Holter monitor to evaluate for A-fib.  Patient on renal dosing of Eliquis 2.5 mg twice a day for her history of DVT/TIA

## 2024-07-23 NOTE — ASSESSMENT & PLAN NOTE
Hypertension is stable and controlled  Continue current treatment regimen.  Blood pressure will be reassessed in 6 months.    Patient on losartan and metoprolol for blood pressure control.  Patient to continue current medications.

## 2024-07-23 NOTE — ASSESSMENT & PLAN NOTE
Patient has a history of Afib and was on Diltiazem.  Patient states that at some point she was taken off the Diltiazem, she does not know why, and has just been on Metoprolol.  The palpitations have been controlled for some time and now have returned.  Plan to restart Diltiazem XL 180mg daily after she has completed her heart monitor.

## 2024-07-25 RX ORDER — DILTIAZEM HYDROCHLORIDE 180 MG/1
180 CAPSULE, EXTENDED RELEASE ORAL DAILY
Qty: 90 CAPSULE | Refills: 3 | Status: SHIPPED | OUTPATIENT
Start: 2024-07-25

## 2024-07-26 ENCOUNTER — TELEPHONE (OUTPATIENT)
Dept: CARDIOLOGY | Facility: CLINIC | Age: 89
End: 2024-07-26
Payer: MEDICARE

## 2024-07-26 NOTE — TELEPHONE ENCOUNTER
Patient called in to report that she didn't wear her CPAP last night and she noticed that when she got up her heart didn't flutter like it does when she wears it. Stated she does have on monitor but wanted to give us a FYI since its going into the weekend.

## 2024-08-08 ENCOUNTER — TELEPHONE (OUTPATIENT)
Dept: CARDIOLOGY | Facility: CLINIC | Age: 89
End: 2024-08-08
Payer: MEDICARE

## 2024-08-08 DIAGNOSIS — I48.0 PAROXYSMAL ATRIAL FIBRILLATION: Primary | ICD-10-CM

## 2024-08-08 RX ORDER — DILTIAZEM HYDROCHLORIDE 120 MG/1
120 CAPSULE, EXTENDED RELEASE ORAL DAILY
Qty: 90 CAPSULE | Refills: 3 | Status: SHIPPED | OUTPATIENT
Start: 2024-08-08

## 2024-08-08 NOTE — TELEPHONE ENCOUNTER
Patient came in stating that the 180mg  diltiazem was too high and was wondering if the 120mg that is the dose that she was on before.

## 2024-08-13 ENCOUNTER — OFFICE VISIT (OUTPATIENT)
Dept: FAMILY MEDICINE CLINIC | Facility: CLINIC | Age: 89
End: 2024-08-13
Payer: MEDICARE

## 2024-08-13 VITALS
HEIGHT: 62 IN | DIASTOLIC BLOOD PRESSURE: 85 MMHG | OXYGEN SATURATION: 96 % | SYSTOLIC BLOOD PRESSURE: 140 MMHG | TEMPERATURE: 98.1 F | BODY MASS INDEX: 24.66 KG/M2 | WEIGHT: 134 LBS | RESPIRATION RATE: 18 BRPM | HEART RATE: 55 BPM

## 2024-08-13 DIAGNOSIS — R82.81 PYURIA: Primary | ICD-10-CM

## 2024-08-13 DIAGNOSIS — I10 PRIMARY HYPERTENSION: ICD-10-CM

## 2024-08-13 DIAGNOSIS — M54.50 ACUTE MIDLINE LOW BACK PAIN WITHOUT SCIATICA: ICD-10-CM

## 2024-08-13 DIAGNOSIS — E11.9 TYPE 2 DIABETES MELLITUS WITHOUT COMPLICATION, WITHOUT LONG-TERM CURRENT USE OF INSULIN: ICD-10-CM

## 2024-08-13 LAB
BILIRUB BLD-MCNC: NEGATIVE MG/DL
CLARITY, POC: CLEAR
COLOR UR: YELLOW
GLUCOSE UR STRIP-MCNC: NEGATIVE MG/DL
KETONES UR QL: NEGATIVE
LEUKOCYTE EST, POC: ABNORMAL
NITRITE UR-MCNC: NEGATIVE MG/ML
PH UR: 6 [PH] (ref 5–8)
PROT UR STRIP-MCNC: NEGATIVE MG/DL
RBC # UR STRIP: NEGATIVE /UL
SP GR UR: 1.02 (ref 1–1.03)
UROBILINOGEN UR QL: ABNORMAL

## 2024-08-13 PROCEDURE — 99214 OFFICE O/P EST MOD 30 MIN: CPT | Performed by: INTERNAL MEDICINE

## 2024-08-13 PROCEDURE — G2211 COMPLEX E/M VISIT ADD ON: HCPCS | Performed by: INTERNAL MEDICINE

## 2024-08-13 PROCEDURE — 1160F RVW MEDS BY RX/DR IN RCRD: CPT | Performed by: INTERNAL MEDICINE

## 2024-08-13 PROCEDURE — 1159F MED LIST DOCD IN RCRD: CPT | Performed by: INTERNAL MEDICINE

## 2024-08-13 PROCEDURE — 81002 URINALYSIS NONAUTO W/O SCOPE: CPT | Performed by: INTERNAL MEDICINE

## 2024-08-13 NOTE — ASSESSMENT & PLAN NOTE
Excellent diabetic control on diet therapy alone, with a hemoglobin A1c of 6.0% in 5/2024.  Continue current lifestyle efforts, repeating hemoglobin A1c typically every 6 months.

## 2024-08-13 NOTE — PROGRESS NOTES
Follow Up Office Visit      Date: 2024   Patient Name: Lou Garcia  : 1932   MRN: [id number]     Chief Complaint:    Chief Complaint   Patient presents with    Urinary Tract Infection       History of Present Illness: Lou Garcia is a 89 y.o. female who is here today for concern about a possible UTI.  Patient notes 2 or 3 days ago developing some vague mid back discomfort, which actually has resolved in the last day, but suspecting this may have been related to a UTI.  She denies any abdominal discomfort, no dysuria hematuria frequency urgency or incontinence, no malodorous urine, no fevers or chills, no other acute related concerns at this time, indicating she currently feels very well.  She is a diabetic on diet therapy with most recent hemoglobin A1c excellent at 6.0% in 2024, and notes her blood pressures have been averaging 130s to 140s over 60s to mid 80s taking diltiazem  mg daily, losartan 50 mg daily, and metoprolol XL 50 mg daily.  Denies chest pains palpitations dyspnea dizziness or any significant edema.  Regular patient of SHELBY Britton.    Subjective      Review of Systems:   Review of Systems    I have reviewed the patients family history, social history, past medical history, past surgical history and have updated it as appropriate.     Medications:     Current Outpatient Medications:     apixaban (Eliquis) 2.5 MG tablet tablet, Take 1 tablet by mouth 2 (Two) Times a Day., Disp: 180 tablet, Rfl: 1    aspirin 81 MG EC tablet, Take 1 tablet by mouth Daily., Disp: , Rfl:     dilTIAZem XR (DILACOR XR) 120 MG 24 hr capsule, Take 1 capsule by mouth Daily., Disp: 90 capsule, Rfl: 3    losartan (COZAAR) 50 MG tablet, Take 1 tablet by mouth Daily., Disp: 90 tablet, Rfl: 1    meclizine (ANTIVERT) 12.5 MG tablet, Take 1 tablet by mouth 3 (Three) Times a Day As Needed for Dizziness., Disp: 20 tablet, Rfl: 0    metoprolol succinate XL (TOPROL-XL) 50 MG 24 hr tablet, Take 1  "tablet by mouth Daily., Disp: 90 tablet, Rfl: 1    pantoprazole (PROTONIX) 40 MG EC tablet, Take 1 tablet by mouth Every Morning., Disp: , Rfl:     Allergies:   No Known Allergies    Objective     Physical Exam: Please see above  Vital Signs:   Vitals:    08/13/24 1603   BP: 140/85   BP Location: Right arm   Patient Position: Sitting   Cuff Size: Adult   Pulse: 55   Resp: 18   Temp: 98.1 °F (36.7 °C)   TempSrc: Temporal   SpO2: 96%   Weight: 60.8 kg (134 lb)   Height: 157.5 cm (62\")     Body mass index is 24.51 kg/m².  BMI is within normal parameters. No other follow-up for BMI required.       Physical Exam  Constitutional:       General: She is not in acute distress.     Appearance: Normal appearance. She is normal weight. She is not ill-appearing.      Comments: Delightful conversant very spry acting 89-year-old female who is in no acute distress, mobilizing using a cane with no difficulty, BMI 24.5   Cardiovascular:      Rate and Rhythm: Normal rate and regular rhythm.      Heart sounds: Normal heart sounds. No murmur heard.     No friction rub. No gallop.   Pulmonary:      Effort: Pulmonary effort is normal. No respiratory distress.      Breath sounds: Normal breath sounds.   Abdominal:      General: Bowel sounds are normal. There is no distension.      Palpations: Abdomen is soft. There is no mass.      Tenderness: There is no abdominal tenderness. There is no right CVA tenderness, left CVA tenderness, guarding or rebound.      Hernia: No hernia is present.   Musculoskeletal:         General: No swelling, tenderness, deformity or signs of injury.      Cervical back: No rigidity or tenderness.      Right lower leg: No edema.      Left lower leg: No edema.   Lymphadenopathy:      Cervical: No cervical adenopathy.   Neurological:      General: No focal deficit present.      Mental Status: She is alert.   Psychiatric:         Mood and Affect: Mood normal.         Procedures    Results:   Labs:   Hemoglobin A1C "   Date Value Ref Range Status   05/09/2024 6.0 (A) 4.5 - 5.7 % Final     TSH   Date Value Ref Range Status   12/30/2022 1.430 0.450 - 4.500 uIU/mL Final        POCT Results (if applicable):   Results for orders placed or performed in visit on 08/13/24   POC Urinalysis Dipstick    Specimen: Urine   Result Value Ref Range    Color Yellow Yellow, Straw, Dark Yellow, Alisha    Clarity, UA Clear Clear    Glucose, UA Negative Negative mg/dL    Bilirubin Negative Negative    Ketones, UA Negative Negative    Specific Gravity  1.020 1.005 - 1.030    Blood, UA Negative Negative    pH, Urine 6.0 5.0 - 8.0    Protein, POC Negative Negative mg/dL    Urobilinogen, UA 0.2 E.U./dL Normal, 0.2 E.U./dL    Leukocytes Trace (A) Negative    Nitrite, UA Negative Negative       Assessment / Plan      Assessment/Plan:   Diagnoses and all orders for this visit:    1. Pyuria (Primary)  Assessment & Plan:  Clean-catch UA today reveals trace leukocytes otherwise no abnormalities noted, patient having had some transient mid back discomfort triggering concern on her part regarding a possible underlying UTI.  She is not having any current back discomfort at this time, no abdominal discomfort, no fevers or chills, nor any specific symptoms of UTI, denying dysuria hematuria frequency urgency or malodorous urine.  Suspect the trace pyuria is contamination, and that the lumbago was unrelated as detailed below.  Patient advised that if she were to develop significant recurrence of abdominal or back pain, or UTI type symptoms, she is to be evaluated promptly.  Continue vigorous hydration.    Orders:  -     POC Urinalysis Dipstick    2. Acute midline low back pain without sciatica  Assessment & Plan:  History of transient mid back discomfort lasting a couple days, resolving yesterday, suspect mechanical i.e. muscular strain or underlying degenerative arthritis, very low suspicion that this symptom complex was secondary to a UTI as noted above.  Given  resolution, simply observe, taking Tylenol if has any recurrent symptoms, noting contraindication to NSAIDs given history of paroxysmal A-fib on Eliquis along with history of chronic kidney disease      3. Type 2 diabetes mellitus without complication, without long-term current use of insulin  Assessment & Plan:  Excellent diabetic control on diet therapy alone, with a hemoglobin A1c of 6.0% in 5/2024.  Continue current lifestyle efforts, repeating hemoglobin A1c typically every 6 months.      4. Primary hypertension  Assessment & Plan:  Stage II blood pressure elevation acutely, satisfactory control chronically by history taking diltiazem  mg daily, losartan 50 mg daily, and metoprolol XL 50 mg daily.  Very mild asymptomatic bradycardia undoubtedly due to the combination of diltiazem and metoprolol, this prescribed given history of paroxysmal atrial fibrillation.  Continue current regimen with monitoring.          Follow Up:   Return if symptoms worsen or fail to improve.      At [facility], we believe that sharing information builds trust and better relationships. You are receiving this note because you recently visited [facility]. It is possible you will see health information before a provider has talked with you about it. This kind of information can be easy to misunderstand. To help you fully understand what it means for your health, we urge you to discuss this note with your provider.    Tanner Mcnulty MD  [facility]

## 2024-08-13 NOTE — ASSESSMENT & PLAN NOTE
Clean-catch UA today reveals trace leukocytes otherwise no abnormalities noted, patient having had some transient mid back discomfort triggering concern on her part regarding a possible underlying UTI.  She is not having any current back discomfort at this time, no abdominal discomfort, no fevers or chills, nor any specific symptoms of UTI, denying dysuria hematuria frequency urgency or malodorous urine.  Suspect the trace pyuria is contamination, and that the lumbago was unrelated as detailed below.  Patient advised that if she were to develop significant recurrence of abdominal or back pain, or UTI type symptoms, she is to be evaluated promptly.  Continue vigorous hydration.

## 2024-08-13 NOTE — ASSESSMENT & PLAN NOTE
History of transient mid back discomfort lasting a couple days, resolving yesterday, suspect mechanical i.e. muscular strain or underlying degenerative arthritis, very low suspicion that this symptom complex was secondary to a UTI as noted above.  Given resolution, simply observe, taking Tylenol if has any recurrent symptoms, noting contraindication to NSAIDs given history of paroxysmal A-fib on Eliquis along with history of chronic kidney disease

## 2024-08-16 ENCOUNTER — OFFICE VISIT (OUTPATIENT)
Dept: CARDIOLOGY | Facility: CLINIC | Age: 89
End: 2024-08-16
Payer: MEDICARE

## 2024-08-16 VITALS
BODY MASS INDEX: 24.64 KG/M2 | HEIGHT: 62 IN | SYSTOLIC BLOOD PRESSURE: 122 MMHG | WEIGHT: 133.9 LBS | DIASTOLIC BLOOD PRESSURE: 70 MMHG | OXYGEN SATURATION: 96 % | HEART RATE: 68 BPM

## 2024-08-16 DIAGNOSIS — I10 PRIMARY HYPERTENSION: Primary | Chronic | ICD-10-CM

## 2024-08-16 DIAGNOSIS — I48.0 PAROXYSMAL ATRIAL FIBRILLATION: ICD-10-CM

## 2024-08-16 DIAGNOSIS — R00.2 PALPITATIONS: ICD-10-CM

## 2024-08-16 NOTE — ASSESSMENT & PLAN NOTE
Patient has a history of atrial fibrillation.  Holter monitor did not show any critical rhythms or A-fib.  There were occasional PVCs noted.  Patient was restarted on her diltiazem  mg daily.

## 2024-08-16 NOTE — PROGRESS NOTES
[facility] Provider Note     Date:   2024  Name: Lou Garcia  :   1932  PCP: Sandra Mckenzie APRN    Chief Complaint   Patient presents with    Palpitations       Subjective     History of Present Illness  Lou Garcia is a 89 y.o. female who presents today for follow-up palpitations.    Heart monitor results have been reviewed and discussed with patient.  She is since started back on the diltiazem  mg.  She states that she just wanted to make sure she was not having atrial fibrillation.  She states since starting back on the medication she has not noticed as many palpitations as she was having.  She states that she feels like it is anxiety related as she was coming in to get her results she had a few palpitations.  She has no significant CV complaints today.  She denies any exertional chest pain or shortness of breath.      Cardiology and sleep related problem list    AKASH- on 2006; [facility]    Titration study 16 titrated to Bipap 16/10cm    Current settings: Abipap 20/10 cm  Diabetes  Anxiety  Hypertension  A-fib  Former smoker    Holter Monitor 24 A relatively benign monitor study.  ·  No a fib seen. Occasional PACs. No pauses.    2023 echo-EF 66%, left ventricular wall thickness is consistent with mild concentric hypertrophy, grade 1 diastolic dysfunction, mild mitral valve stenosis is present with functional MAC, mild mitral valve regurgitation.    2021 echo- normal EF, mild concentric left ventricular hypertrophy, mild to moderate MR, abnormal diastolic function, mild mitral stenosis, mild tricuspid regurgitation, mild pulmonary hypertension.    2022 Holter     Reports Denies   Chest Pain [] [x]   Shortness of Air [] [x]   Palpitations [x]Occasional []   Edema [] [x]   Dizziness [] [x]   Syncope [] [x]         No Known Allergies    Current Outpatient Medications:     apixaban (Eliquis) 2.5 MG tablet tablet, Take 1  "tablet by mouth 2 (Two) Times a Day., Disp: 180 tablet, Rfl: 1    aspirin 81 MG EC tablet, Take 1 tablet by mouth Daily., Disp: , Rfl:     dilTIAZem XR (DILACOR XR) 120 MG 24 hr capsule, Take 1 capsule by mouth Daily., Disp: 90 capsule, Rfl: 3    losartan (COZAAR) 50 MG tablet, Take 1 tablet by mouth Daily., Disp: 90 tablet, Rfl: 1    meclizine (ANTIVERT) 12.5 MG tablet, Take 1 tablet by mouth 3 (Three) Times a Day As Needed for Dizziness., Disp: 20 tablet, Rfl: 0    metoprolol succinate XL (TOPROL-XL) 50 MG 24 hr tablet, Take 1 tablet by mouth Daily., Disp: 90 tablet, Rfl: 1    pantoprazole (PROTONIX) 40 MG EC tablet, Take 1 tablet by mouth Every Morning., Disp: , Rfl:     Past Medical History:   Diagnosis Date    Cataracts, bilateral     Nonrheumatic mitral (valve) insufficiency     AKASH (obstructive sleep apnea)     : BIPAP    Other long term (current) drug therapy     Type 2 diabetes mellitus without complication       Past Surgical History:   Procedure Laterality Date    CATARACT EXTRACTION      CHOLECYSTECTOMY       Family History   Problem Relation Age of Onset    Coronary artery disease Mother     Sleep apnea Mother     Cancer Father     Cancer Sister     Sleep apnea Sister     Coronary artery disease Sister     Heart disease Brother     Diabetes Brother     Heart disease Brother      Social History     Socioeconomic History    Marital status:     Number of children: 1   Tobacco Use    Smoking status: Former     Current packs/day: 0.00     Average packs/day: 0.3 packs/day for 0.5 years (0.1 ttl pk-yrs)     Types: Cigarettes     Start date: 1994     Quit date:      Years since quittin.6     Passive exposure: Past    Smokeless tobacco: Never   Vaping Use    Vaping status: Never Used   Substance and Sexual Activity    Alcohol use: Not Currently    Drug use: Not Currently    Sexual activity: Defer       Objective     Vital Signs:  /70   Pulse 68   Ht 157.5 cm (62\")   Wt 60.7 kg " "(133 lb 14.4 oz)   SpO2 96%   BMI 24.49 kg/m²   Estimated body mass index is 24.49 kg/m² as calculated from the following:    Height as of this encounter: 157.5 cm (62\").    Weight as of this encounter: 60.7 kg (133 lb 14.4 oz).       BMI is within normal parameters. No other follow-up for BMI required.      Physical Exam  Constitutional:       Appearance: Normal appearance.   Cardiovascular:      Rate and Rhythm: Normal rate and regular rhythm.      Pulses: Normal pulses.      Heart sounds: Normal heart sounds.   Pulmonary:      Effort: Pulmonary effort is normal.   Skin:     General: Skin is warm and dry.   Neurological:      General: No focal deficit present.      Mental Status: She is alert and oriented to person, place, and time.      Comments: Walks with cane   Psychiatric:         Mood and Affect: Mood normal.         Behavior: Behavior normal.         Thought Content: Thought content normal.         Judgment: Judgment normal.         The following data was reviewed by: SHELBY Llanes on 08/16/2024:  Holter monitor results have been reviewed and discussed with patient and her daughter.            Assessment and Plan     Diagnoses and all orders for this visit:    1. Primary hypertension (Primary)  Assessment & Plan:  Hypertension is stable and controlled  Continue current treatment regimen.  Blood pressure will be reassessed in 6 months.      2. Paroxysmal atrial fibrillation  Assessment & Plan:  Patient has a history of atrial fibrillation.  Holter monitor did not show any critical rhythms or A-fib.  There were occasional PVCs noted.  Patient was restarted on her diltiazem  mg daily.      3. Palpitations  Assessment & Plan:  Holter monitor results reviewed with patient and her daughter.  No critical rhythms no atrial fibrillation.  Occasional PVCs were noted.  Patient states that she felt like it was related to her anxiety.  She has since started back on the diltiazem  mg.  She states " that she has not noticed the palpitations as much since restarting the medication.  She states that she did have a couple of palpitations when she came into the office not knowing what her heart monitor results were.    Will follow-up in 6 months or sooner if any issues or concerns.          Recommendations: ER if symptoms increase, Report if any new/changing symptoms immediately, Limit salt, and Limit caffeine          Follow Up  Return in about 6 months (around 2/16/2025) for Next scheduled follow up.  Patient was given instructions and counseling regarding her condition or for health maintenance advice. Please see specific information pulled into the AVS if appropriate.

## 2024-08-16 NOTE — ASSESSMENT & PLAN NOTE
Holter monitor results reviewed with patient and her daughter.  No critical rhythms no atrial fibrillation.  Occasional PVCs were noted.  Patient states that she felt like it was related to her anxiety.  She has since started back on the diltiazem  mg.  She states that she has not noticed the palpitations as much since restarting the medication.  She states that she did have a couple of palpitations when she came into the office not knowing what her heart monitor results were.    Will follow-up in 6 months or sooner if any issues or concerns.

## 2024-08-26 ENCOUNTER — TELEPHONE (OUTPATIENT)
Dept: CARDIOLOGY | Facility: CLINIC | Age: 89
End: 2024-08-26
Payer: MEDICARE

## 2024-08-26 NOTE — TELEPHONE ENCOUNTER
Patient called in states that since she been on Diltiazem 120 mg her HR has been 56 to 58. Wanted to call and report it. Please advise.

## 2024-08-26 NOTE — TELEPHONE ENCOUNTER
Called patient back she watch heart rate and call us back if she hale to d/c medication due to dizziness or syncope.

## 2024-10-07 DIAGNOSIS — I48.0 PAROXYSMAL ATRIAL FIBRILLATION: ICD-10-CM

## 2024-10-17 ENCOUNTER — OFFICE VISIT (OUTPATIENT)
Dept: FAMILY MEDICINE CLINIC | Facility: CLINIC | Age: 89
End: 2024-10-17
Payer: MEDICARE

## 2024-10-17 VITALS
OXYGEN SATURATION: 94 % | HEART RATE: 78 BPM | DIASTOLIC BLOOD PRESSURE: 82 MMHG | SYSTOLIC BLOOD PRESSURE: 112 MMHG | HEIGHT: 62 IN | TEMPERATURE: 97.9 F | BODY MASS INDEX: 24.84 KG/M2 | WEIGHT: 135 LBS | RESPIRATION RATE: 18 BRPM

## 2024-10-17 DIAGNOSIS — G47.33 OBSTRUCTIVE SLEEP APNEA SYNDROME: ICD-10-CM

## 2024-10-17 DIAGNOSIS — I10 PRIMARY HYPERTENSION: ICD-10-CM

## 2024-10-17 DIAGNOSIS — E11.9 TYPE 2 DIABETES MELLITUS WITHOUT COMPLICATION, WITHOUT LONG-TERM CURRENT USE OF INSULIN: Primary | ICD-10-CM

## 2024-10-17 DIAGNOSIS — I48.0 PAROXYSMAL ATRIAL FIBRILLATION: ICD-10-CM

## 2024-10-17 LAB
EXPIRATION DATE: ABNORMAL
HBA1C MFR BLD: 6.2 % (ref 4.5–5.7)
Lab: ABNORMAL

## 2024-10-17 PROCEDURE — 99214 OFFICE O/P EST MOD 30 MIN: CPT | Performed by: NURSE PRACTITIONER

## 2024-10-17 PROCEDURE — 1159F MED LIST DOCD IN RCRD: CPT | Performed by: NURSE PRACTITIONER

## 2024-10-17 PROCEDURE — 83036 HEMOGLOBIN GLYCOSYLATED A1C: CPT | Performed by: NURSE PRACTITIONER

## 2024-10-17 PROCEDURE — 1160F RVW MEDS BY RX/DR IN RCRD: CPT | Performed by: NURSE PRACTITIONER

## 2024-10-17 PROCEDURE — 3044F HG A1C LEVEL LT 7.0%: CPT | Performed by: NURSE PRACTITIONER

## 2024-10-17 NOTE — ASSESSMENT & PLAN NOTE
Patient is an excellent job with controlling her diabetes with only dietary modification.  Her A1c in office today is 6.1%.  She does not check her glucose at home.  She is up-to-date on diabetic eye exam without signs of retinopathy.

## 2024-10-17 NOTE — PROGRESS NOTES
Office Note     Name: Lou Garcia    : 1932     MRN: [id number]     Chief Complaint  follow up medicine     Subjective     History of Present Illness:  Lou Garcia is a 89 y.o. female who presents today for follow up on chronic conditions including paroxysmal A-fib, type 2 diabetes, hypertension, sleep apnea.  Patient's blood pressure exhibiting excellent control in office today, 112/82.  Patient has been checking her blood pressure regularly since a recent appointment with her cardiologist.  2 months ago after experiencing some palpitations patient was monitored with Holter monitor which showed only some PVCs and no runs of atrial fibs.  She was started back on her diltiazem 120 mg daily with resolution of her palpitations.  Patient continues to endorse excellent compliance with nightly PAP use.  Patient has historically done very well with controlling her diabetes with dietary modifications.  She does not check her glucose at home but A1c in office today is 6.1%.  She has no complaints or concerns today    Review of Systems   Constitutional:  Negative for chills, fatigue and fever.   Respiratory:  Negative for cough, chest tightness, shortness of breath and wheezing.    Gastrointestinal:  Negative for abdominal pain, constipation, diarrhea, nausea and vomiting.   Endocrine: Negative for polydipsia and polyuria.   Musculoskeletal:  Positive for arthralgias.   Neurological:  Negative for dizziness, weakness, light-headedness, headache and memory problem.   Hematological:  Does not bruise/bleed easily.   Psychiatric/Behavioral:  Negative for agitation, self-injury, sleep disturbance, suicidal ideas, depressed mood and stress. The patient is not nervous/anxious.        Objective     Past Medical History:   Diagnosis Date    Cataracts, bilateral     Nonrheumatic mitral (valve) insufficiency     AKASH (obstructive sleep apnea)     : BIPAP    Other long term (current) drug therapy     Type 2 diabetes  "mellitus without complication      Past Surgical History:   Procedure Laterality Date    CATARACT EXTRACTION      CHOLECYSTECTOMY       Family History   Problem Relation Age of Onset    Coronary artery disease Mother     Sleep apnea Mother     Cancer Father     Cancer Sister     Sleep apnea Sister     Coronary artery disease Sister     Heart disease Brother     Diabetes Brother     Heart disease Brother        Vital Signs  /82 (BP Location: Left arm, Patient Position: Sitting, Cuff Size: Adult)   Pulse 78   Temp 97.9 °F (36.6 °C) (Temporal)   Resp 18   Ht 157.5 cm (62\")   Wt 61.2 kg (135 lb)   SpO2 94%   BMI 24.69 kg/m²   Estimated body mass index is 24.69 kg/m² as calculated from the following:    Height as of this encounter: 157.5 cm (62\").    Weight as of this encounter: 61.2 kg (135 lb).  Facility age limit for growth %darlene is 20 years.    Physical Exam  Vitals reviewed.   Constitutional:       Appearance: Normal appearance.   HENT:      Head: Normocephalic and atraumatic.      Right Ear: Tympanic membrane, ear canal and external ear normal.      Left Ear: Tympanic membrane, ear canal and external ear normal.      Nose: Nose normal.      Mouth/Throat:      Mouth: Mucous membranes are moist.      Pharynx: Oropharynx is clear.   Eyes:      Conjunctiva/sclera: Conjunctivae normal.      Pupils: Pupils are equal, round, and reactive to light.   Cardiovascular:      Rate and Rhythm: Normal rate and regular rhythm.      Pulses: Normal pulses.      Heart sounds: Normal heart sounds.   Pulmonary:      Effort: Pulmonary effort is normal.      Breath sounds: Normal breath sounds.   Abdominal:      General: Bowel sounds are normal.      Palpations: Abdomen is soft.   Musculoskeletal:         General: Normal range of motion.      Cervical back: Neck supple.   Skin:     General: Skin is warm and dry.      Capillary Refill: Capillary refill takes less than 2 seconds.   Neurological:      Mental Status: She is " alert and oriented to person, place, and time.              POCT Results (if applicable):  Results for orders placed or performed in visit on 10/17/24   POC Glycosylated Hemoglobin (Hb A1C)    Collection Time: 10/17/24  9:10 AM    Specimen: Blood   Result Value Ref Range    Hemoglobin A1C 6.2 (A) 4.5 - 5.7 %    Lot Number [id number]     Expiration Date 6,102,026             Assessment and Plan     Diagnoses and all orders for this visit:    1. Type 2 diabetes mellitus without complication, without long-term current use of insulin (Primary)  Assessment & Plan:  Patient is an excellent job with controlling her diabetes with only dietary modification.  Her A1c in office today is 6.1%.  She does not check her glucose at home.  She is up-to-date on diabetic eye exam without signs of retinopathy.    Orders:  -     POC Glycosylated Hemoglobin (Hb A1C)    2. Paroxysmal atrial fibrillation  Assessment & Plan:  Has known history of paroxysmal A-fib, a couple of months ago she felt as though she was having palpitations so reported to her cardiologist for further evaluation.  She wore a Holter monitor which did not show any critical rhythms or A-fib, only occasional PVCs.  She was restarted on her diltiazem  mg daily at that time without any further feelings of palpitation.  Patient continues to utilize aspirin 81 mg daily and Eliquis 2.5 mg twice daily as well.      3. Primary hypertension  Assessment & Plan:  Blood pressure exhibiting excellent control, 112/82 in office today.  Currently utilizing diltiazem 120 mg daily, losartan 50 mg daily and metoprolol XL 50 mg daily      4. Obstructive sleep apnea syndrome  Assessment & Plan:  Patient notes excellent compliance with nightly BiPAP use.        BMI is within normal parameters. No other follow-up for BMI required.        Follow Up  Return in about 5 months (around 3/17/2025) for Medicare Wellness.    Sandra Mckenzie, APRN

## 2024-10-17 NOTE — ASSESSMENT & PLAN NOTE
Blood pressure exhibiting excellent control, 112/82 in office today.  Currently utilizing diltiazem 120 mg daily, losartan 50 mg daily and metoprolol XL 50 mg daily

## 2024-10-17 NOTE — ASSESSMENT & PLAN NOTE
Has known history of paroxysmal A-fib, a couple of months ago she felt as though she was having palpitations so reported to her cardiologist for further evaluation.  She wore a Holter monitor which did not show any critical rhythms or A-fib, only occasional PVCs.  She was restarted on her diltiazem  mg daily at that time without any further feelings of palpitation.  Patient continues to utilize aspirin 81 mg daily and Eliquis 2.5 mg twice daily as well.

## 2024-11-15 DIAGNOSIS — I48.0 PAROXYSMAL ATRIAL FIBRILLATION: ICD-10-CM

## 2024-11-15 RX ORDER — DILTIAZEM HYDROCHLORIDE 120 MG/1
120 CAPSULE, EXTENDED RELEASE ORAL DAILY
Qty: 90 CAPSULE | Refills: 3 | Status: SHIPPED | OUTPATIENT
Start: 2024-11-15

## 2024-11-15 NOTE — TELEPHONE ENCOUNTER
"  Caller: Lou Garcia \"June\"    Relationship: Self    Best call back number: [phone]    Requested Prescriptions:   Requested Prescriptions     Pending Prescriptions Disp Refills    dilTIAZem XR (DILACOR XR) 120 MG 24 hr capsule 90 capsule 3     Sig: Take 1 capsule by mouth Daily.        Pharmacy where request should be sent: [id number] - [place], [place] - [address]  AT [facility] - [phone]  - [phone] FX     Last office visit with prescribing clinician: 10/17/2024   Last telemedicine visit with prescribing clinician: Visit date not found   Next office visit with prescribing clinician: Visit date not found     Additional details provided by patient: PATIENT NEEDS A REFILL     Does the patient have less than a 3 day supply:  [x] Yes  [] No    Would you like a call back once the refill request has been completed: [] Yes [x] No    If the office needs to give you a call back, can they leave a voicemail: [] Yes [x] No    Brenna Luna Rep   11/15/24 11:51 EST         "

## 2024-11-18 DIAGNOSIS — I48.0 PAROXYSMAL ATRIAL FIBRILLATION: ICD-10-CM

## 2024-11-18 RX ORDER — DILTIAZEM HYDROCHLORIDE 120 MG/1
120 CAPSULE, EXTENDED RELEASE ORAL DAILY
Qty: 90 CAPSULE | Refills: 3 | Status: CANCELLED | OUTPATIENT
Start: 2024-11-18

## 2024-12-01 DIAGNOSIS — I48.0 PAROXYSMAL ATRIAL FIBRILLATION: ICD-10-CM

## 2024-12-02 RX ORDER — METOPROLOL SUCCINATE 50 MG/1
50 TABLET, EXTENDED RELEASE ORAL DAILY
Qty: 90 TABLET | Refills: 1 | Status: SHIPPED | OUTPATIENT
Start: 2024-12-02 | End: 2024-12-09 | Stop reason: SDUPTHER

## 2024-12-09 DIAGNOSIS — I48.0 PAROXYSMAL ATRIAL FIBRILLATION: ICD-10-CM

## 2024-12-09 RX ORDER — METOPROLOL SUCCINATE 50 MG/1
50 TABLET, EXTENDED RELEASE ORAL DAILY
Qty: 90 TABLET | Refills: 1 | Status: SHIPPED | OUTPATIENT
Start: 2024-12-09

## 2024-12-13 ENCOUNTER — OFFICE VISIT (OUTPATIENT)
Dept: FAMILY MEDICINE CLINIC | Facility: CLINIC | Age: 89
End: 2024-12-13
Payer: MEDICARE

## 2024-12-13 VITALS
HEIGHT: 62 IN | TEMPERATURE: 97.3 F | HEART RATE: 64 BPM | WEIGHT: 132.4 LBS | SYSTOLIC BLOOD PRESSURE: 122 MMHG | BODY MASS INDEX: 24.37 KG/M2 | DIASTOLIC BLOOD PRESSURE: 70 MMHG | RESPIRATION RATE: 16 BRPM | OXYGEN SATURATION: 96 %

## 2024-12-13 DIAGNOSIS — I10 PRIMARY HYPERTENSION: ICD-10-CM

## 2024-12-13 DIAGNOSIS — I48.0 PAROXYSMAL ATRIAL FIBRILLATION: Primary | ICD-10-CM

## 2024-12-13 DIAGNOSIS — E11.65 TYPE 2 DIABETES MELLITUS WITH HYPERGLYCEMIA, WITHOUT LONG-TERM CURRENT USE OF INSULIN: ICD-10-CM

## 2024-12-13 NOTE — PROGRESS NOTES
"    Office Note     Name: Lou Garcia    : 1932     MRN: [id number]     Chief Complaint  ER follow up    Subjective     History of Present Illness:  Lou Garcia is a 89 y.o. female who presents today for follow up after being evaluated in the emergency department at [facility] four weeks ago for concerns she had a swollen gland in her neck. Patient denied any pain, fever, chills or swallowing difficulty. Patient can't remember exactly what she was told but \"everything was fine\". She was not treated with any antibiotics or other intervention. She has not had recurrence of this symptom. Patient followed for chronic conditions including DM II, a-fib and HTN. Patient has always been able to control her glucose with diet and exercise. She has never had to utilize medications. She does not check her glucose at home. Her A1C was checked in office eight weeks ago and quite satisfactory at 6.2%. BP well controlled, 122/70 in office today. a couple of months ago she felt as though she was having palpitations so reported to her cardiologist for further evaluation. She wore a Holter monitor which did not show any critical rhythms or A-fib, only occasional PVCs. She was restarted on her diltiazem  mg daily at that time without any further feelings of palpitation. Patient continues to utilize aspirin 81 mg daily and Eliquis 2.5 mg twice daily as well. No complaints today    Review of Systems   Constitutional:  Negative for chills, fatigue and fever.   HENT:  Negative for drooling, mouth sores, nosebleeds, sore throat, swollen glands, trouble swallowing and voice change.    Respiratory:  Negative for cough and shortness of breath.    Cardiovascular:  Negative for chest pain, palpitations and leg swelling.   Gastrointestinal:  Negative for constipation, diarrhea, nausea and vomiting.   Neurological:  Negative for dizziness, weakness and light-headedness.       Objective     Past Medical History:   Diagnosis Date    " "Cataracts, bilateral     Nonrheumatic mitral (valve) insufficiency     AKASH (obstructive sleep apnea)     : BIPAP    Other long term (current) drug therapy     Type 2 diabetes mellitus without complication      Past Surgical History:   Procedure Laterality Date    CATARACT EXTRACTION      CHOLECYSTECTOMY       Family History   Problem Relation Age of Onset    Coronary artery disease Mother     Sleep apnea Mother     Cancer Father     Cancer Sister     Sleep apnea Sister     Coronary artery disease Sister     Heart disease Brother     Diabetes Brother     Heart disease Brother        Vital Signs  /70 (BP Location: Left arm, Patient Position: Sitting, Cuff Size: Adult)   Pulse 64   Temp 97.3 °F (36.3 °C) (Temporal)   Resp 16   Ht 157.5 cm (62\")   Wt 60.1 kg (132 lb 6.4 oz)   SpO2 96%   BMI 24.22 kg/m²   Estimated body mass index is 24.22 kg/m² as calculated from the following:    Height as of this encounter: 157.5 cm (62\").    Weight as of this encounter: 60.1 kg (132 lb 6.4 oz).  Facility age limit for growth %darlene is 20 years.    Physical Exam  Vitals reviewed.   Constitutional:       Appearance: Normal appearance.   HENT:      Right Ear: Tympanic membrane, ear canal and external ear normal.      Left Ear: Tympanic membrane, ear canal and external ear normal.      Nose: Nose normal.      Mouth/Throat:      Mouth: Mucous membranes are moist.      Pharynx: Oropharynx is clear.   Eyes:      Conjunctiva/sclera: Conjunctivae normal.      Pupils: Pupils are equal, round, and reactive to light.   Cardiovascular:      Rate and Rhythm: Normal rate and regular rhythm.      Pulses: Normal pulses.      Heart sounds: Normal heart sounds.   Pulmonary:      Effort: Pulmonary effort is normal.      Breath sounds: Normal breath sounds.   Abdominal:      General: Bowel sounds are normal.      Palpations: Abdomen is soft.   Musculoskeletal:      Cervical back: Neck supple. No tenderness.   Lymphadenopathy:      " Cervical: No cervical adenopathy.   Skin:     General: Skin is warm and dry.   Neurological:      Mental Status: She is alert and oriented to person, place, and time.          POCT Results (if applicable):  Results for orders placed or performed in visit on 10/17/24   POC Glycosylated Hemoglobin (Hb A1C)    Collection Time: 10/17/24  9:10 AM    Specimen: Blood   Result Value Ref Range    Hemoglobin A1C 6.2 (A) 4.5 - 5.7 %    Lot Number [id number]     Expiration Date 6,102,026             Assessment and Plan     Diagnoses and all orders for this visit:    1. Paroxysmal atrial fibrillation (Primary)  Assessment & Plan:  Has known history of paroxysmal A-fib, a couple of months ago she felt as though she was having palpitations so reported to her cardiologist for further evaluation. She wore a Holter monitor which did not show any critical rhythms or A-fib, only occasional PVCs. She was restarted on her diltiazem  mg daily at that time without any further feelings of palpitation. Patient continues to utilize aspirin 81 mg daily and Eliquis 2.5 mg twice daily as well.       2. Type 2 diabetes mellitus with hyperglycemia, without long-term current use of insulin  Assessment & Plan:  Patient has always been able to control her glucose with diet and exercise. She has never had to utilize medications. She does not check her glucose at home. Her A1C was checked in office eight weeks ago and quite satisfactory at 6.2%      3. Primary hypertension  Assessment & Plan:  Blood pressure exhibiting excellent control in office today, 122/70.  -Continue losartan 50mg daily  -Continue metoprolol XL 50mg daily        BMI is within normal parameters. No other follow-up for BMI required.        Follow Up  No follow-ups on file.    Sandra Mckenzie, APRN

## 2024-12-16 NOTE — ASSESSMENT & PLAN NOTE
Blood pressure exhibiting excellent control in office today, 122/70.  -Continue losartan 50mg daily  -Continue metoprolol XL 50mg daily

## 2024-12-16 NOTE — ASSESSMENT & PLAN NOTE
Patient has always been able to control her glucose with diet and exercise. She has never had to utilize medications. She does not check her glucose at home. Her A1C was checked in office eight weeks ago and quite satisfactory at 6.2%

## 2024-12-20 ENCOUNTER — OFFICE VISIT (OUTPATIENT)
Dept: CARDIOLOGY | Facility: CLINIC | Age: 89
End: 2024-12-20
Payer: MEDICARE

## 2024-12-20 VITALS
WEIGHT: 134 LBS | HEART RATE: 79 BPM | HEIGHT: 62 IN | OXYGEN SATURATION: 94 % | SYSTOLIC BLOOD PRESSURE: 116 MMHG | DIASTOLIC BLOOD PRESSURE: 70 MMHG | BODY MASS INDEX: 24.66 KG/M2

## 2024-12-20 DIAGNOSIS — I48.0 PAROXYSMAL ATRIAL FIBRILLATION: ICD-10-CM

## 2024-12-20 DIAGNOSIS — G47.33 OBSTRUCTIVE SLEEP APNEA SYNDROME: Primary | ICD-10-CM

## 2024-12-20 DIAGNOSIS — I10 PRIMARY HYPERTENSION: Chronic | ICD-10-CM

## 2024-12-20 PROCEDURE — 99213 OFFICE O/P EST LOW 20 MIN: CPT | Performed by: NURSE PRACTITIONER

## 2024-12-20 NOTE — PROGRESS NOTES
Follow-Up Sleep Consult     Date:   2024  Name: Lou Garcia  :   1932  PCP: Sandra Mckenzie APRN    Chief Complaint   Patient presents with    Hypertension    Sleep Apnea       Subjective     History of Present Illness  Lou Garcia is a 89 y.o. female who presents today for follow-up on AKASH.    Patient is here today with her daughter.  Patient states that she has been doing very well and she feels very good.  She states that her blood pressure has been well-controlled.  She reports that she is not having the palpitations as before.  She states that they have been well-controlled with the diltiazem.  She states that she gets around and she continues to care for herself and her .    Patient states that she is wearing her BiPAP.  Her sleep is well rested.  Mask fit and airflow are comfortable.    Cardiology and sleep related problem list    AKASH- on 2006; [facility]    Titration study 16 titrated to Bipap 16/10cm    Current settings: Abipap 20/10 cm  Diabetes  Anxiety  Hypertension  A-fib  Former smoker    Holter Monitor 24 A relatively benign monitor study.  ·  No a fib seen. Occasional PACs. No pauses.    2023 echo-EF 66%, left ventricular wall thickness is consistent with mild concentric hypertrophy, grade 1 diastolic dysfunction, mild mitral valve stenosis is present with functional MAC, mild mitral valve regurgitation.    2021 echo- normal EF, mild concentric left ventricular hypertrophy, mild to moderate MR, abnormal diastolic function, mild mitral stenosis, mild tricuspid regurgitation, mild pulmonary hypertension.    2022 Holter    Current mask used is FFM    Device Functioning Well: Yes  Mask Fit Comfortable: Yes  Air Flow Comfortable: Yes  DME Helpful for Supplies: Yes  Sleep is rested: Yes        Device Download:                 The patient's relevant past medical, surgical, family, and social history reviewed and updated in Epic as  "appropriate.    Past Medical History:   Diagnosis Date    Cataracts, bilateral     Nonrheumatic mitral (valve) insufficiency     AKASH (obstructive sleep apnea)     : BIPAP    Other long term (current) drug therapy     Type 2 diabetes mellitus without complication      Past Surgical History:   Procedure Laterality Date    CATARACT EXTRACTION      CHOLECYSTECTOMY       OB History    No obstetric history on file.       No Known Allergies  Prior to Admission medications    Medication Sig Start Date End Date Taking? Authorizing Provider   apixaban (Eliquis) 2.5 MG tablet tablet Take 1 tablet by mouth 2 (Two) Times a Day. 12/9/24  Yes Radha Parker MD   aspirin 81 MG EC tablet Take 1 tablet by mouth Daily.   Yes Travis Harden MD   dilTIAZem XR (DILACOR XR) 120 MG 24 hr capsule Take 1 capsule by mouth Daily. 11/15/24  Yes Sandra Mckenzie APRN   losartan (COZAAR) 50 MG tablet Take 1 tablet by mouth Daily. 7/5/24  Yes Linda Dominguez APRN   meclizine (ANTIVERT) 12.5 MG tablet Take 1 tablet by mouth 3 (Three) Times a Day As Needed for Dizziness. 3/7/24  Yes Sandra Mckenzie APRN   metoprolol succinate XL (TOPROL-XL) 50 MG 24 hr tablet Take 1 tablet by mouth Daily. 12/9/24  Yes Radha Parker MD   pantoprazole (PROTONIX) 40 MG EC tablet Take 1 tablet by mouth Every Morning. 10/26/22  Yes Travis Harden MD     Family History   Problem Relation Age of Onset    Coronary artery disease Mother     Sleep apnea Mother     Cancer Father     Cancer Sister     Sleep apnea Sister     Coronary artery disease Sister     Heart disease Brother     Diabetes Brother     Heart disease Brother        Objective     Vital Signs:  /70   Pulse 79   Ht 157.5 cm (62\")   Wt 60.8 kg (134 lb)   SpO2 94%   BMI 24.51 kg/m²     BMI is within normal parameters. No other follow-up for BMI required.        Physical Exam  Constitutional:       Appearance: Normal appearance.   Cardiovascular:      Rate and " Rhythm: Normal rate and regular rhythm.      Pulses: Normal pulses.      Heart sounds: Normal heart sounds.   Pulmonary:      Effort: Pulmonary effort is normal.      Breath sounds: Normal breath sounds.   Musculoskeletal:      Right lower leg: No edema.      Left lower leg: No edema.   Skin:     General: Skin is warm and dry.      Capillary Refill: Capillary refill takes less than 2 seconds.   Neurological:      General: No focal deficit present.      Mental Status: She is alert and oriented to person, place, and time.      Comments: Walks with a cane   Psychiatric:         Mood and Affect: Mood normal.         Behavior: Behavior normal.         Thought Content: Thought content normal.         Judgment: Judgment normal.         The following data was reviewed by: SHELBY Llanes on 12/20/2024:  Common labs          5/9/2024    10:46 10/17/2024    09:10   Common Labs   Hemoglobin A1C 6.0  6.2      30-day download 11/19/2024 to 12/18/2024 I have reviewed interpret the data from the download.  Download shows good compliance and control.  Patient is receiving benefit from PAP therapy plan to continue current treatment.         Assessment and Plan     Diagnoses and all orders for this visit:    1. Obstructive sleep apnea syndrome (Primary)  Assessment & Plan:  Patient has a baseline .  This is severe sleep apnea.  Download shows good compliance and control.  Mask fit and airflow are comfortable.  Patient is receiving benefit from PAP therapy.  Plan to continue current treatment.      2. Primary hypertension  Assessment & Plan:  Hypertension is stable and controlled  Continue current treatment regimen.  Blood pressure will be reassessed in 6 months.      3. Paroxysmal atrial fibrillation  Assessment & Plan:  Patient continues on her diltiazem, metoprolol, and Eliquis.    She denies any exertional shortness of breath or chest pain.  She reports that she has not had any episodes that she knows of.  She states  that she overall feels very well and she continues to care for her  and for herself.          ER if symptoms increase, Report if any new/changing symptoms immediately, Limit salt, Elevate legs, Sleep risks reviewed (driving, medical, sleep death, sedating agents), and Sleep hygiene discussed         Follow Up  Return in about 6 months (around 6/20/2025) for AKASH/Afib.  Patient was given instructions and counseling regarding her condition or for health maintenance advice. Please see specific information pulled into the AVS if appropriate.

## 2024-12-20 NOTE — ASSESSMENT & PLAN NOTE
Patient continues on her diltiazem, metoprolol, and Eliquis.    She denies any exertional shortness of breath or chest pain.  She reports that she has not had any episodes that she knows of.  She states that she overall feels very well and she continues to care for her  and for herself.

## 2024-12-20 NOTE — ASSESSMENT & PLAN NOTE
Patient has a baseline .  This is severe sleep apnea.  Download shows good compliance and control.  Mask fit and airflow are comfortable.  Patient is receiving benefit from PAP therapy.  Plan to continue current treatment.

## 2024-12-30 RX ORDER — LOSARTAN POTASSIUM 50 MG/1
50 TABLET ORAL DAILY
Qty: 90 TABLET | Refills: 3 | Status: SHIPPED | OUTPATIENT
Start: 2024-12-30

## 2025-01-22 ENCOUNTER — TELEPHONE (OUTPATIENT)
Dept: FAMILY MEDICINE CLINIC | Facility: CLINIC | Age: OVER 89
End: 2025-01-22
Payer: MEDICARE

## 2025-01-22 ENCOUNTER — TELEPHONE (OUTPATIENT)
Dept: CARDIOLOGY | Facility: CLINIC | Age: OVER 89
End: 2025-01-22
Payer: MEDICARE

## 2025-01-22 NOTE — TELEPHONE ENCOUNTER
"Pt called the office confused and asking for a pill because she is seeing \"a man on the wall\". She states that he follows her around but does not bother her. I encouraged her to call PCP. After hanging up, I called her daughter, Jessica and explained. Her daughter is going to check on her and call her pcp for an appointment to check for a possible UTI. Patient's daughter states that this happened back in the summer as well.   "

## 2025-01-22 NOTE — TELEPHONE ENCOUNTER
"  Caller: Lou Garcia \"June\"    Relationship: Self    Best call back number: [phone]    Who are you requesting to speak with (clinical staff, provider,  specific staff member): JUST ANY NURSE    What was the call regarding: QUESTION FOR THE NURSE  "

## 2025-01-23 ENCOUNTER — OFFICE VISIT (OUTPATIENT)
Dept: FAMILY MEDICINE CLINIC | Facility: CLINIC | Age: OVER 89
End: 2025-01-23
Payer: MEDICARE

## 2025-01-23 VITALS
DIASTOLIC BLOOD PRESSURE: 76 MMHG | OXYGEN SATURATION: 97 % | WEIGHT: 134 LBS | HEIGHT: 62 IN | HEART RATE: 64 BPM | BODY MASS INDEX: 24.66 KG/M2 | SYSTOLIC BLOOD PRESSURE: 120 MMHG | RESPIRATION RATE: 16 BRPM | TEMPERATURE: 97.4 F

## 2025-01-23 DIAGNOSIS — R44.1 HALLUCINATION, VISUAL: ICD-10-CM

## 2025-01-23 DIAGNOSIS — N18.32 STAGE 3B CHRONIC KIDNEY DISEASE: Primary | ICD-10-CM

## 2025-01-23 NOTE — PROGRESS NOTES
Venipuncture Blood Specimen Collection  Venipuncture performed in left arm by Agatha Merrill MA with good hemostasis. Patient tolerated the procedure well without complications.   01/23/25   Agatha Merrill MA

## 2025-01-23 NOTE — PROGRESS NOTES
"    Office Note     Name: Lou Garcia    : 1932     MRN: [id number]     Chief Complaint  Difficulty Urinating    Subjective     History of Present Illness:  Lou Garcia is a 89 y.o. female who presents today for hallucinations.  Patient states that for the last 2 days she has been \"seeing people who are not there\".  They do not talk to her and are not constantly there but coming and going.  She states she is aware the people she is seeing is not really there.  Patient has done this in the past when suffering from urinary tract infection.  She has not had recent exposure to any new medications.  At this time she denies any fever, nausea, vomiting, dysuria, suprapubic pressure or pyuria.  Patient's blood pressure is very well-controlled in office today, 120/76.  She is accompanied by her daughter today who states patient has not appeared confused or disoriented.  No further complaints or concerns    Review of Systems   Constitutional:  Positive for fever. Negative for chills and fatigue.   Eyes:  Negative for blurred vision, double vision and visual disturbance.   Respiratory:  Negative for cough and shortness of breath.    Cardiovascular:  Negative for chest pain, palpitations and leg swelling.   Gastrointestinal:  Negative for abdominal pain, constipation, diarrhea, nausea and vomiting.   Endocrine: Negative for polydipsia and polyuria.   Genitourinary:  Negative for decreased urine volume, difficulty urinating, dysuria, flank pain, frequency, hematuria, urgency and urinary incontinence.   Musculoskeletal:  Negative for arthralgias and myalgias.   Neurological:  Negative for dizziness, weakness, light-headedness, numbness, headache and memory problem.   Psychiatric/Behavioral:  Positive for hallucinations. Negative for agitation, behavioral problems, decreased concentration, dysphoric mood, self-injury, sleep disturbance, suicidal ideas, negative for hyperactivity, depressed mood and stress. The patient " "is not nervous/anxious.        Objective     Past Medical History:   Diagnosis Date    Cataracts, bilateral     Nonrheumatic mitral (valve) insufficiency     AKASH (obstructive sleep apnea)     : BIPAP    Other long term (current) drug therapy     Type 2 diabetes mellitus without complication      Past Surgical History:   Procedure Laterality Date    CATARACT EXTRACTION      CHOLECYSTECTOMY       Family History   Problem Relation Age of Onset    Coronary artery disease Mother     Sleep apnea Mother     Cancer Father     Cancer Sister     Sleep apnea Sister     Coronary artery disease Sister     Heart disease Brother     Diabetes Brother     Heart disease Brother        Vital Signs  /76 (BP Location: Left arm, Patient Position: Sitting, Cuff Size: Adult)   Pulse 64   Temp 97.4 °F (36.3 °C) (Temporal)   Resp 16   Ht 157.5 cm (62\")   Wt 60.8 kg (134 lb)   SpO2 97%   BMI 24.51 kg/m²   Estimated body mass index is 24.51 kg/m² as calculated from the following:    Height as of this encounter: 157.5 cm (62\").    Weight as of this encounter: 60.8 kg (134 lb).  Facility age limit for growth %darlene is 20 years.    Physical Exam  Vitals reviewed.   Constitutional:       Appearance: Normal appearance.   HENT:      Right Ear: Tympanic membrane, ear canal and external ear normal.      Left Ear: Tympanic membrane, ear canal and external ear normal.      Nose: Nose normal.      Mouth/Throat:      Mouth: Mucous membranes are moist.      Pharynx: Oropharynx is clear.   Eyes:      Conjunctiva/sclera: Conjunctivae normal.      Pupils: Pupils are equal, round, and reactive to light.   Cardiovascular:      Rate and Rhythm: Normal rate and regular rhythm.      Pulses: Normal pulses.      Heart sounds: Normal heart sounds.   Pulmonary:      Effort: Pulmonary effort is normal.      Breath sounds: Normal breath sounds.   Abdominal:      General: Bowel sounds are normal.      Palpations: Abdomen is soft.      Tenderness: There " is no abdominal tenderness. There is no right CVA tenderness or left CVA tenderness.   Musculoskeletal:         General: Normal range of motion.      Cervical back: Neck supple.   Skin:     General: Skin is warm and dry.   Neurological:      Mental Status: She is alert and oriented to person, place, and time.   Psychiatric:         Attention and Perception: Attention and perception normal.         Mood and Affect: Mood normal.         Speech: Speech normal.         Behavior: Behavior normal. Behavior is cooperative.         Thought Content: Thought content normal.         Cognition and Memory: Cognition and memory normal.         Judgment: Judgment normal.     Patient with longstanding pattern of stage IIIb CKD.  Baseline creatinine in the 1.4 range with baseline GFR of 37.  POCT Results (if applicable):  Results for orders placed or performed in visit on 01/23/25   Urine Culture - Urine, Urine, Clean Catch    Collection Time: 01/23/25 12:00 AM    Specimen: Urine, Clean Catch    Urine  Release to yoana   Result Value Ref Range    Urine Culture Final report     Result 1 No growth    Please Note    Collection Time: 01/23/25 12:00 AM   Result Value Ref Range    Please note Comment    Comprehensive Metabolic Panel    Collection Time: 01/23/25 10:42 AM    Specimen: Arm, Left; Blood    Blood  Release to yoana   Result Value Ref Range    Glucose 100 (H) 70 - 99 mg/dL    BUN 24 10 - 36 mg/dL    Creatinine 1.27 (H) 0.57 - 1.00 mg/dL    EGFR Result 40 (L) >59 mL/min/1.73    BUN/Creatinine Ratio 19 12 - 28    Sodium 139 134 - 144 mmol/L    Potassium 4.9 3.5 - 5.2 mmol/L    Chloride 99 96 - 106 mmol/L    Total CO2 23 20 - 29 mmol/L    Calcium 9.7 8.7 - 10.3 mg/dL    Total Protein 6.5 6.0 - 8.5 g/dL    Albumin 4.2 3.6 - 4.6 g/dL    Globulin 2.3 1.5 - 4.5 g/dL    Total Bilirubin 0.3 0.0 - 1.2 mg/dL    Alkaline Phosphatase 93 44 - 121 IU/L    AST (SGOT) 26 0 - 40 IU/L    ALT (SGPT) 18 0 - 32 IU/L            Assessment and Plan  "    Diagnoses and all orders for this visit:    1. Stage 3b chronic kidney disease (Primary)  Assessment & Plan:  Patient with longstanding pattern of stage IIIb CKD.  Baseline creatinine in the 1.4 range with baseline GFR of 37.  Patient preserved kidney function with excellent control of her blood pressure and glucose.  Rechecking CMP today to assess for any possible electrolyte abnormalities that may be causing her visual hallucinations.    Orders:  -     Comprehensive Metabolic Panel; Future  -     Cancel: POC Urinalysis Dipstick  -     Comprehensive Metabolic Panel  -     Urine Culture - Urine, Urine, Clean Catch    2. Hallucination, visual  Assessment & Plan:  Patient states that for the last 2 days she has been \"seeing people who are not there\".  They do not talk to her and are not constantly there but coming and going.  She states she is aware the people she is seeing is not really there.  Patient has done this in the past when suffering from urinary tract infection.  She has not had recent exposure to any new medications.  At this time she denies any fever, nausea, vomiting, dysuria, suprapubic pressure or pyuria.  -Patient has very unremarkable physical exam in office today.  Will assess for any electrolyte abnormalities with CMP.  We are also obtaining urine culture to evaluate for possible UTI as etiology of her hallucinations.  If these tests are inconclusive we will send for diagnostic imaging of her brain.      Other orders  -     Please Note    Addended 01/27/2025: Patient's urine culture and labs had no abnormalities that should be causing her hallucinations. Patient contacted and advised I would be ordered a head CT for further evaluation.   BMI is within normal parameters. No other follow-up for BMI required.    Follow Up  No follow-ups on file.    Sandra Mckenzie, APRN  "

## 2025-01-24 LAB
ALBUMIN SERPL-MCNC: 4.2 G/DL (ref 3.6–4.6)
ALP SERPL-CCNC: 93 IU/L (ref 44–121)
ALT SERPL-CCNC: 18 IU/L (ref 0–32)
AST SERPL-CCNC: 26 IU/L (ref 0–40)
BILIRUB SERPL-MCNC: 0.3 MG/DL (ref 0–1.2)
BUN SERPL-MCNC: 24 MG/DL (ref 10–36)
BUN/CREAT SERPL: 19 (ref 12–28)
CALCIUM SERPL-MCNC: 9.7 MG/DL (ref 8.7–10.3)
CHLORIDE SERPL-SCNC: 99 MMOL/L (ref 96–106)
CO2 SERPL-SCNC: 23 MMOL/L (ref 20–29)
CREAT SERPL-MCNC: 1.27 MG/DL (ref 0.57–1)
EGFRCR SERPLBLD CKD-EPI 2021: 40 ML/MIN/1.73
GLOBULIN SER CALC-MCNC: 2.3 G/DL (ref 1.5–4.5)
GLUCOSE SERPL-MCNC: 100 MG/DL (ref 70–99)
POTASSIUM SERPL-SCNC: 4.9 MMOL/L (ref 3.5–5.2)
PROT SERPL-MCNC: 6.5 G/DL (ref 6–8.5)
SODIUM SERPL-SCNC: 139 MMOL/L (ref 134–144)

## 2025-01-25 LAB
BACTERIA UR CULT: NO GROWTH
BACTERIA UR CULT: NORMAL
Lab: NORMAL

## 2025-01-26 PROBLEM — R44.1 HALLUCINATION, VISUAL: Status: ACTIVE | Noted: 2025-01-26

## 2025-01-26 NOTE — ASSESSMENT & PLAN NOTE
Patient with longstanding pattern of stage IIIb CKD.  Baseline creatinine in the 1.4 range with baseline GFR of 37.  Patient preserved kidney function with excellent control of her blood pressure and glucose.  Rechecking CMP today to assess for any possible electrolyte abnormalities that may be causing her visual hallucinations.

## 2025-01-26 NOTE — ASSESSMENT & PLAN NOTE
"Patient states that for the last 2 days she has been \"seeing people who are not there\".  They do not talk to her and are not constantly there but coming and going.  She states she is aware the people she is seeing is not really there.  Patient has done this in the past when suffering from urinary tract infection.  She has not had recent exposure to any new medications.  At this time she denies any fever, nausea, vomiting, dysuria, suprapubic pressure or pyuria.  -Patient has very unremarkable physical exam in office today.  Will assess for any electrolyte abnormalities with CMP.  We are also obtaining urine culture to evaluate for possible UTI as etiology of her hallucinations.  If these tests are inconclusive we will send for diagnostic imaging of her brain.  "

## 2025-01-27 ENCOUNTER — TELEPHONE (OUTPATIENT)
Dept: FAMILY MEDICINE CLINIC | Facility: CLINIC | Age: OVER 89
End: 2025-01-27
Payer: MEDICARE

## 2025-01-27 NOTE — TELEPHONE ENCOUNTER
Patient called and advised me that she wanted to have her eyes checked before she has the CT done. Advised Sandra and she verbalized understanding

## 2025-05-28 DIAGNOSIS — I48.0 PAROXYSMAL ATRIAL FIBRILLATION: ICD-10-CM

## 2025-05-28 RX ORDER — METOPROLOL SUCCINATE 50 MG/1
50 TABLET, EXTENDED RELEASE ORAL DAILY
Qty: 90 TABLET | Refills: 0 | Status: SHIPPED | OUTPATIENT
Start: 2025-05-28

## 2025-06-20 ENCOUNTER — OFFICE VISIT (OUTPATIENT)
Dept: CARDIOLOGY | Facility: CLINIC | Age: OVER 89
End: 2025-06-20
Payer: MEDICARE

## 2025-06-20 ENCOUNTER — PATIENT ROUNDING (BHMG ONLY) (OUTPATIENT)
Dept: CARDIOLOGY | Facility: CLINIC | Age: OVER 89
End: 2025-06-20

## 2025-06-20 VITALS
DIASTOLIC BLOOD PRESSURE: 86 MMHG | HEART RATE: 77 BPM | WEIGHT: 129.5 LBS | OXYGEN SATURATION: 92 % | BODY MASS INDEX: 23.83 KG/M2 | HEIGHT: 62 IN | SYSTOLIC BLOOD PRESSURE: 124 MMHG

## 2025-06-20 DIAGNOSIS — I48.0 PAROXYSMAL ATRIAL FIBRILLATION: Primary | Chronic | ICD-10-CM

## 2025-06-20 DIAGNOSIS — R00.2 PALPITATIONS: ICD-10-CM

## 2025-06-20 DIAGNOSIS — I10 PRIMARY HYPERTENSION: Chronic | ICD-10-CM

## 2025-06-20 DIAGNOSIS — G47.33 OBSTRUCTIVE SLEEP APNEA SYNDROME: ICD-10-CM

## 2025-06-20 PROCEDURE — 1160F RVW MEDS BY RX/DR IN RCRD: CPT | Performed by: NURSE PRACTITIONER

## 2025-06-20 PROCEDURE — 99214 OFFICE O/P EST MOD 30 MIN: CPT | Performed by: NURSE PRACTITIONER

## 2025-06-20 PROCEDURE — 1159F MED LIST DOCD IN RCRD: CPT | Performed by: NURSE PRACTITIONER

## 2025-06-20 NOTE — PROGRESS NOTES
..My name is Sabina Mortensen and I am the Practice Manager for [facility] [place].    I would like to thank you for being a loyal patient. If you do not mind I would like to ask you a few questions about your recent visit with us.  Please feel free to reply if you wish to provide us with feedback on your first visit with our practice.    First, could you tell me what went well with your recent visit?    Secondly, we are always looking for ways to make our patients' experiences even better.  Do you have any recommendations on ways we may improve?    Finally, overall were you satisfied with your first visit to us as a [facility] facility?    In the next few days, you will be receiving a Patient Experience Survey.      Thank you for taking the time to answer a few questions today.  I hope you have a good day.

## 2025-06-20 NOTE — PROGRESS NOTES
..My name is Sabina Mortensen and I am the Practice Manager for [facility] [place].    I would like to thank you for being a loyal patient. If you do not mind I would like to ask you a few questions about your recent visit with us.  Please feel free to reply if you wish to provide us with feedback on your first visit with our practice.    First, could you tell me what went well with your recent visit?    Secondly, we are always looking for ways to make our patients' experiences even better.  Do you have any recommendations on ways we may improve?    Finally, overall were you satisfied with your first visit to us as a [facility] facility?    In the next few days, you will be receiving a Patient Experience Survey.      Thank you for taking the time to answer a few questions today.  I hope you have a good day.

## 2025-06-20 NOTE — ASSESSMENT & PLAN NOTE
Hypertension is stable and controlled.  Blood pressure in the office today 124/86.  Continue current treatment regimen.  Patient to continue her losartan, metoprolol, and diltiazem as well as her PAP therapy.  Untreated sleep apnea may potentiate hypertension.  Blood pressure will be reassessed in 6 months.

## 2025-06-20 NOTE — ASSESSMENT & PLAN NOTE
She reports some mornings she gets up to fix a little breakfast and does feel some fluttering in her chest.  She states that this does not happen every morning maybe once or twice a week.  She states that she overall feels very well and feels that her palpitations and A-fib are well-controlled on her current medications.    Patient to continue her diltiazem, metoprolol, aspirin, and Eliquis.

## 2025-06-20 NOTE — ASSESSMENT & PLAN NOTE
Used to have some occasional fluttering in her chest, but otherwise well-controlled on her current medications.

## 2025-06-20 NOTE — PROGRESS NOTES
[facility] Provider Note     Date:   2025  Name: Lou Garcia  :   1932  PCP: Sandra Mckenzie APRN    Chief Complaint   Patient presents with    Follow-up     AKASH/AFIB       Subjective     History of Present Illness  Lou Garcia is a 89 y.o. female who presents today for follow up Afib/HTN/AKASH.    Patient states that she has been doing overall very well.  She reports that in the morning when she gets up to make a little bit of breakfast she does feel some fluttering in her chest that only lasts a few seconds.  She states that this does not happen every morning maybe once or twice a week.  Otherwise she says that the palpitations have been well-controlled on her current medications.  She denies any other CV complaints.  She denies any exertional chest pain or shortness of breath, edema, presyncope, or syncope.    Patient reports that she had a leak in her mask and has just received a new cushion and feels this is helped with the leaking.  She reports that she wears her PAP device every single night.  She reports that her sleep is well rested.  She reports that she does take a little nap during the day.    Cardiology and sleep related problem list    AKASH- on 2006; [facility]    Titration study 16 titrated to Bipap 16/10cm    Current settings: Abipap 20/10 cm  Diabetes  Anxiety  Hypertension  A-fib  Former smoker    Holter Monitor 24 A relatively benign monitor study.  ·  No a fib seen. Occasional PACs. No pauses.    2023 echo-EF 66%, left ventricular wall thickness is consistent with mild concentric hypertrophy, grade 1 diastolic dysfunction, mild mitral valve stenosis is present with functional MAC, mild mitral valve regurgitation.    2021 echo- normal EF, mild concentric left ventricular hypertrophy, mild to moderate MR, abnormal diastolic function, mild mitral stenosis, mild tricuspid regurgitation, mild pulmonary  hypertension.    1/2022 Holter     Reports Denies   Chest Pain [] [x]   Shortness of Air [] [x]   Palpitations [x] []   Edema [] [x]   Dizziness [] [x]   Syncope [] [x]       Current mask used is NM    Device Functioning Well: Yes  Mask Fit Comfortable: Yes  Air Flow Comfortable: Yes  DME Helpful for Supplies: Yes  Sleep is rested:   Yes        Device Download:               No Known Allergies    Current Outpatient Medications:     apixaban (Eliquis) 2.5 MG tablet tablet, Take 1 tablet by mouth 2 (Two) Times a Day., Disp: 180 tablet, Rfl: 1    aspirin 81 MG EC tablet, Take 1 tablet by mouth Daily., Disp: , Rfl:     dilTIAZem XR (DILACOR XR) 120 MG 24 hr capsule, Take 1 capsule by mouth Daily., Disp: 90 capsule, Rfl: 3    losartan (COZAAR) 50 MG tablet, Take 1 tablet by mouth Daily., Disp: 90 tablet, Rfl: 3    metoprolol succinate XL (TOPROL-XL) 50 MG 24 hr tablet, Take 1 tablet by mouth once daily, Disp: 90 tablet, Rfl: 0    pantoprazole (PROTONIX) 40 MG EC tablet, Take 1 tablet by mouth Every Morning., Disp: , Rfl:     Past Medical History:   Diagnosis Date    Cataracts, bilateral     Nonrheumatic mitral (valve) insufficiency     AKASH (obstructive sleep apnea)     : BIPAP    Other long term (current) drug therapy     Type 2 diabetes mellitus without complication       Past Surgical History:   Procedure Laterality Date    CATARACT EXTRACTION      CHOLECYSTECTOMY       Family History   Problem Relation Age of Onset    Coronary artery disease Mother     Sleep apnea Mother     Cancer Father     Cancer Sister     Sleep apnea Sister     Coronary artery disease Sister     Heart disease Brother     Diabetes Brother     Heart disease Brother      Social History     Socioeconomic History    Marital status:     Number of children: 1   Tobacco Use    Smoking status: Former     Current packs/day: 0.00     Average packs/day: 0.3 packs/day for 0.5 years (0.1 ttl pk-yrs)     Types: Cigarettes     Start date: 07/1994  "    Quit date:      Years since quittin.4     Passive exposure: Past    Smokeless tobacco: Never   Vaping Use    Vaping status: Never Used   Substance and Sexual Activity    Alcohol use: Not Currently    Drug use: Not Currently    Sexual activity: Defer       Objective     Vital Signs:  /86 (BP Location: Left arm, Patient Position: Sitting, Cuff Size: Adult)   Pulse 77   Ht 157.5 cm (62\")   Wt 58.7 kg (129 lb 8 oz)   SpO2 92%   BMI 23.69 kg/m²   Estimated body mass index is 23.69 kg/m² as calculated from the following:    Height as of this encounter: 157.5 cm (62\").    Weight as of this encounter: 58.7 kg (129 lb 8 oz).       BMI is within normal parameters. No other follow-up for BMI required.      Physical Exam  Constitutional:       Appearance: Normal appearance.   Cardiovascular:      Rate and Rhythm: Normal rate and regular rhythm.      Pulses: Normal pulses.      Heart sounds: Normal heart sounds.   Pulmonary:      Effort: Pulmonary effort is normal.      Breath sounds: Normal breath sounds.   Musculoskeletal:         General: Normal range of motion.   Skin:     General: Skin is warm and dry.      Capillary Refill: Capillary refill takes less than 2 seconds.   Neurological:      General: No focal deficit present.      Mental Status: She is alert and oriented to person, place, and time.   Psychiatric:         Mood and Affect: Mood normal.         Behavior: Behavior normal.         Thought Content: Thought content normal.         Judgment: Judgment normal.       The following data was reviewed by: SHELBY Llanes on 2025:    30-day download 2025 to 2025 I have reviewed interpret the data from the download.          Assessment and Plan     Diagnoses and all orders for this visit:    1. Paroxysmal atrial fibrillation (Primary)  Assessment & Plan:  She reports some mornings she gets up to fix a little breakfast and does feel some fluttering in her chest.  She states that " this does not happen every morning maybe once or twice a week.  She states that she overall feels very well and feels that her palpitations and A-fib are well-controlled on her current medications.    Patient to continue her diltiazem, metoprolol, aspirin, and Eliquis.      2. Primary hypertension  Assessment & Plan:  Hypertension is stable and controlled.  Blood pressure in the office today 124/86.  Continue current treatment regimen.  Patient to continue her losartan, metoprolol, and diltiazem as well as her PAP therapy.  Untreated sleep apnea may potentiate hypertension.  Blood pressure will be reassessed in 6 months.          3. Palpitations  Assessment & Plan:  Used to have some occasional fluttering in her chest, but otherwise well-controlled on her current medications.      4. Obstructive sleep apnea syndrome  Assessment & Plan:  Patient has a baseline .  This is severe sleep apnea.  Download shows good compliance and control.  Mask fit and airflow are comfortable.  Patient is receiving benefit from PAP therapy.  Plan to continue current treatment.          Recommendations: ER if symptoms increase, Report if any new/changing symptoms immediately, Limit salt, Limit caffeine, Sleep risks reviewed (driving, medical, sleep death, sedating agents), and Sleep hygiene discussed          Follow Up  Return in about 6 months (around 12/20/2025) for AKASH/Afib/HTN.  Patient was given instructions and counseling regarding her condition or for health maintenance advice. Please see specific information pulled into the AVS if appropriate.

## 2025-08-04 ENCOUNTER — TELEPHONE (OUTPATIENT)
Dept: FAMILY MEDICINE CLINIC | Facility: CLINIC | Age: OVER 89
End: 2025-08-04
Payer: MEDICARE

## 2025-08-06 ENCOUNTER — TELEPHONE (OUTPATIENT)
Dept: CARDIOLOGY | Facility: CLINIC | Age: OVER 89
End: 2025-08-06
Payer: MEDICARE

## 2025-08-06 DIAGNOSIS — G47.33 OBSTRUCTIVE SLEEP APNEA SYNDROME: Primary | ICD-10-CM

## 2025-08-14 DIAGNOSIS — E11.9 TYPE 2 DIABETES MELLITUS WITHOUT COMPLICATION, WITHOUT LONG-TERM CURRENT USE OF INSULIN: Primary | ICD-10-CM

## 2025-08-14 DIAGNOSIS — I10 PRIMARY HYPERTENSION: ICD-10-CM

## 2025-08-17 LAB — HBA1C MFR BLD: 6.1 %

## 2025-08-20 ENCOUNTER — TELEPHONE (OUTPATIENT)
Dept: FAMILY MEDICINE CLINIC | Facility: CLINIC | Age: OVER 89
End: 2025-08-20
Payer: MEDICARE